# Patient Record
Sex: FEMALE | Race: BLACK OR AFRICAN AMERICAN | NOT HISPANIC OR LATINO | Employment: OTHER | URBAN - METROPOLITAN AREA
[De-identification: names, ages, dates, MRNs, and addresses within clinical notes are randomized per-mention and may not be internally consistent; named-entity substitution may affect disease eponyms.]

---

## 2018-07-18 ENCOUNTER — APPOINTMENT (OUTPATIENT)
Dept: MRI IMAGING | Facility: CLINIC | Age: 73
End: 2018-07-18
Attending: FAMILY MEDICINE
Payer: MEDICARE

## 2018-07-18 ENCOUNTER — HOSPITAL ENCOUNTER (EMERGENCY)
Facility: CLINIC | Age: 73
Discharge: HOME | End: 2018-07-18
Attending: FAMILY MEDICINE | Admitting: FAMILY MEDICINE
Payer: MEDICARE

## 2018-07-18 VITALS
SYSTOLIC BLOOD PRESSURE: 121 MMHG | WEIGHT: 219 LBS | RESPIRATION RATE: 16 BRPM | DIASTOLIC BLOOD PRESSURE: 77 MMHG | TEMPERATURE: 98.4 F | OXYGEN SATURATION: 100 % | HEART RATE: 78 BPM

## 2018-07-18 DIAGNOSIS — R42 VERTIGO: ICD-10-CM

## 2018-07-18 LAB
ALBUMIN SERPL-MCNC: 3.1 G/DL (ref 3.4–5)
ALP SERPL-CCNC: 134 U/L (ref 40–150)
ALT SERPL W P-5'-P-CCNC: 19 U/L (ref 0–50)
ANION GAP SERPL CALCULATED.3IONS-SCNC: 6 MMOL/L (ref 3–14)
AST SERPL W P-5'-P-CCNC: 30 U/L (ref 0–45)
BASOPHILS # BLD AUTO: 0 10E9/L (ref 0–0.2)
BASOPHILS NFR BLD AUTO: 0.3 %
BILIRUB SERPL-MCNC: 0.5 MG/DL (ref 0.2–1.3)
BUN SERPL-MCNC: 13 MG/DL (ref 7–30)
CALCIUM SERPL-MCNC: 8.5 MG/DL (ref 8.5–10.1)
CHLORIDE SERPL-SCNC: 109 MMOL/L (ref 94–109)
CO2 SERPL-SCNC: 27 MMOL/L (ref 20–32)
CREAT SERPL-MCNC: 1.19 MG/DL (ref 0.52–1.04)
DIFFERENTIAL METHOD BLD: ABNORMAL
EOSINOPHIL # BLD AUTO: 0.1 10E9/L (ref 0–0.7)
EOSINOPHIL NFR BLD AUTO: 3.2 %
ERYTHROCYTE [DISTWIDTH] IN BLOOD BY AUTOMATED COUNT: 14.5 % (ref 10–15)
ERYTHROCYTE [SEDIMENTATION RATE] IN BLOOD BY WESTERGREN METHOD: 39 MM/H (ref 0–30)
GFR SERPL CREATININE-BSD FRML MDRD: 44 ML/MIN/1.7M2
GLUCOSE SERPL-MCNC: 89 MG/DL (ref 70–99)
HCT VFR BLD AUTO: 32.4 % (ref 35–47)
HGB BLD-MCNC: 10.3 G/DL (ref 11.7–15.7)
IMM GRANULOCYTES # BLD: 0 10E9/L (ref 0–0.4)
IMM GRANULOCYTES NFR BLD: 0 %
INTERPRETATION ECG - MUSE: NORMAL
LYMPHOCYTES # BLD AUTO: 1.9 10E9/L (ref 0.8–5.3)
LYMPHOCYTES NFR BLD AUTO: 51.6 %
MCH RBC QN AUTO: 28.1 PG (ref 26.5–33)
MCHC RBC AUTO-ENTMCNC: 31.8 G/DL (ref 31.5–36.5)
MCV RBC AUTO: 88 FL (ref 78–100)
MONOCYTES # BLD AUTO: 0.3 10E9/L (ref 0–1.3)
MONOCYTES NFR BLD AUTO: 8.8 %
NEUTROPHILS # BLD AUTO: 1.4 10E9/L (ref 1.6–8.3)
NEUTROPHILS NFR BLD AUTO: 36.1 %
NRBC # BLD AUTO: 0 10*3/UL
NRBC BLD AUTO-RTO: 0 /100
PLATELET # BLD AUTO: 160 10E9/L (ref 150–450)
POTASSIUM SERPL-SCNC: 3.8 MMOL/L (ref 3.4–5.3)
PROT SERPL-MCNC: 7.3 G/DL (ref 6.8–8.8)
RBC # BLD AUTO: 3.67 10E12/L (ref 3.8–5.2)
SODIUM SERPL-SCNC: 142 MMOL/L (ref 133–144)
TROPONIN I SERPL-MCNC: <0.015 UG/L (ref 0–0.04)
WBC # BLD AUTO: 3.7 10E9/L (ref 4–11)

## 2018-07-18 PROCEDURE — 93010 ELECTROCARDIOGRAM REPORT: CPT | Mod: Z6 | Performed by: FAMILY MEDICINE

## 2018-07-18 PROCEDURE — 96361 HYDRATE IV INFUSION ADD-ON: CPT | Performed by: FAMILY MEDICINE

## 2018-07-18 PROCEDURE — 70544 MR ANGIOGRAPHY HEAD W/O DYE: CPT

## 2018-07-18 PROCEDURE — 25000128 H RX IP 250 OP 636: Performed by: FAMILY MEDICINE

## 2018-07-18 PROCEDURE — 25000131 ZZH RX MED GY IP 250 OP 636 PS 637: Mod: GY | Performed by: FAMILY MEDICINE

## 2018-07-18 PROCEDURE — 99284 EMERGENCY DEPT VISIT MOD MDM: CPT | Mod: 25 | Performed by: FAMILY MEDICINE

## 2018-07-18 PROCEDURE — 85652 RBC SED RATE AUTOMATED: CPT | Performed by: FAMILY MEDICINE

## 2018-07-18 PROCEDURE — 96360 HYDRATION IV INFUSION INIT: CPT | Performed by: FAMILY MEDICINE

## 2018-07-18 PROCEDURE — 70549 MR ANGIOGRAPH NECK W/O&W/DYE: CPT

## 2018-07-18 PROCEDURE — 85025 COMPLETE CBC W/AUTO DIFF WBC: CPT | Performed by: FAMILY MEDICINE

## 2018-07-18 PROCEDURE — A9585 GADOBUTROL INJECTION: HCPCS | Performed by: FAMILY MEDICINE

## 2018-07-18 PROCEDURE — A9270 NON-COVERED ITEM OR SERVICE: HCPCS | Mod: GY | Performed by: FAMILY MEDICINE

## 2018-07-18 PROCEDURE — 70553 MRI BRAIN STEM W/O & W/DYE: CPT

## 2018-07-18 PROCEDURE — 84484 ASSAY OF TROPONIN QUANT: CPT | Performed by: FAMILY MEDICINE

## 2018-07-18 PROCEDURE — 80053 COMPREHEN METABOLIC PANEL: CPT | Performed by: FAMILY MEDICINE

## 2018-07-18 PROCEDURE — 99284 EMERGENCY DEPT VISIT MOD MDM: CPT | Mod: Z6 | Performed by: FAMILY MEDICINE

## 2018-07-18 PROCEDURE — 93005 ELECTROCARDIOGRAM TRACING: CPT | Performed by: FAMILY MEDICINE

## 2018-07-18 RX ORDER — GADOBUTROL 604.72 MG/ML
10 INJECTION INTRAVENOUS ONCE
Status: COMPLETED | OUTPATIENT
Start: 2018-07-18 | End: 2018-07-18

## 2018-07-18 RX ORDER — MECLIZINE HCL 12.5 MG 12.5 MG/1
25 TABLET ORAL 4 TIMES DAILY PRN
Qty: 30 TABLET | Refills: 0 | Status: SHIPPED | OUTPATIENT
Start: 2018-07-18 | End: 2021-02-22

## 2018-07-18 RX ORDER — MECLIZINE HYDROCHLORIDE 25 MG/1
25 TABLET ORAL ONCE
Status: COMPLETED | OUTPATIENT
Start: 2018-07-18 | End: 2018-07-18

## 2018-07-18 RX ADMIN — SODIUM CHLORIDE 1000 ML: 9 INJECTION, SOLUTION INTRAVENOUS at 08:03

## 2018-07-18 RX ADMIN — SODIUM CHLORIDE 50 ML: 9 INJECTION, SOLUTION INTRAVENOUS at 10:10

## 2018-07-18 RX ADMIN — GADOBUTROL 10 ML: 604.72 INJECTION INTRAVENOUS at 10:11

## 2018-07-18 RX ADMIN — MECLIZINE 25 MG: 25 TABLET ORAL at 07:43

## 2018-07-18 NOTE — ED AVS SNAPSHOT
[facility], [place], Emergency Department    [address]    [place] [address]    Phone:  [phone]    Fax:  [phone]                                       Sonja Perez   MRN: [id number]    Department:  [facility], Emergency Department   Date of Visit:  7/18/2018           After Visit Summary Signature Page     I have received my discharge instructions, and my questions have been answered. I have discussed any challenges I see with this plan with the nurse or doctor.    ..........................................................................................................................................  Patient/Patient Representative Signature      ..........................................................................................................................................  Patient Representative Print Name and Relationship to Patient    ..................................................               ................................................  Date                                            Time    ..........................................................................................................................................  Reviewed by Signature/Title    ...................................................              ..............................................  Date                                                            Time

## 2018-07-18 NOTE — ED AVS SNAPSHOT
[facility], Emergency Department    [address]    [place] [address]    Phone:  [phone]    Fax:  [phone]                                       Sonja Perez   MRN: [id number]    Department:  [facility], Emergency Department   Date of Visit:  7/18/2018           Patient Information     Date Of Birth          1945        Your diagnoses for this visit were:     Vertigo        You were seen by Neeraj Mendez MD.        Discharge Instructions       Thank you for choosing [facility].     Please closely monitor for further symptoms. Return to the Emergency Department if you develop any new or worsening signs or symptoms.    If you received any opiate pain medications or sedatives during your visit, please do not drive for at least 8 hours.     Labs, cultures or final xray interpretations may still need to be reviewed.  We will call you if your plan of care needs to be changed.    Please follow up with your primary care physician or clinic in the next 5-7 days for recheck.      Discharge References/Attachments     BENIGN PAROXYSMAL POSITIONAL VERTIGO (ENGLISH)      24 Hour Appointment Hotline       To make an appointment at any [facility] clinic, call [phone]). If you don't have a family doctor or clinic, we will help you find one. [facility] clinics are conveniently located to serve the needs of you and your family.             Review of your medicines      START taking        Dose / Directions Last dose taken    meclizine 12.5 MG tablet   Commonly known as:  ANTIVERT   Dose:  25 mg   Quantity:  30 tablet        Take 2 tablets (25 mg) by mouth 4 times daily as needed for dizziness   Refills:  0          Our records show that you are taking the medicines listed below. If these are incorrect, please call your family doctor or clinic.        Dose / Directions Last dose taken    TYLENOL PO        Refills:  0                Prescriptions were sent  "or printed at these locations (1 Prescription)                   Other Prescriptions                Printed at Department/Unit printer (1 of 1)         meclizine (ANTIVERT) 12.5 MG tablet                Procedures and tests performed during your visit     CBC with platelets differential    Comprehensive metabolic panel    EKG 12 lead    Erythrocyte sedimentation rate auto    MR Brain w/o & w Contrast    MRA Brain ([place]) wo Contrast    MRA Neck (Carotids) wo & w Contrast    Troponin I      Orders Needing Specimen Collection     None      Pending Results     Date and Time Order Name Status Description    7/18/2018 0712 MR Brain w/o & w Contrast Preliminary     7/18/2018 0712 MRA Neck (Carotids) wo & w Contrast Preliminary             Pending Culture Results     No orders found from 7/16/2018 to 7/19/2018.            Pending Results Instructions     If you had any lab results that were not finalized at the time of your Discharge, you can call the ED Lab Result RN at [phone]. You will be contacted by this team for any positive Lab results or changes in treatment. The nurses are available 7 days a week from 10A to 6:30P.  You can leave a message 24 hours per day and they will return your call.        Thank you for choosing [facility]       Thank you for choosing [facility] for your care. Our goal is always to provide you with excellent care. Hearing back from our patients is one way we can continue to improve our services. Please take a few minutes to complete the written survey that you may receive in the mail after you visit with us. Thank you!        [employer] Information     [employer] lets you send messages to your doctor, view your test results, renew your prescriptions, schedule appointments and more. To sign up, go to [email or web address] . Click on \"Log in\" on the left side of the screen, which will take you to the Welcome page. Then click on \"Sign up Now\" on the right side of the page.     You will " be asked to enter the access code listed below, as well as some personal information. Please follow the directions to create your username and password.     Your access code is: [email or web address]  Expires: 10/16/2018 11:07 AM     Your access code will  in 90 days. If you need help or a new code, please call your [facility] clinic or [phone].        Care EveryWhere ID     This is your Care EveryWhere ID. This could be used by other organizations to access your [facility] medical records  [id number]        Equal Access to Services     [facility]: Hadyonas Roa, ronak cabral, sylvia waddellalswati hickey, carrington jones . So [facility] [phone].    ATENCIÓN: Si habla español, tiene a bermudez disposición servicios gratuitos de asistencia lingüística. Neil al [phone].    We comply with applicable federal civil rights laws and [place] laws. We do not discriminate on the basis of race, color, national origin, age, disability, sex, sexual orientation, or gender identity.            After Visit Summary       This is your record. Keep this with you and show to your community pharmacist(s) and doctor(s) at your next visit.

## 2018-07-18 NOTE — ED PROVIDER NOTES
History     Chief Complaint   Patient presents with     Dizziness     dizziness x 3 days     Headache     x 3 days; denies trauma, N/V, and photophobia     HPI  Sonja Perez is a 72 year old female who sense with vertigo, headache, difficulty concentrating.  Patient states she has a history of episodic vertigo which dates back several years.  Usually she can manage this by laying down in a quiet place.  3 days ago she developed vertigo characterized as the room spinning.  Shortly thereafter she had the gradual onset of a frontal headache which has grown progressively worse over 3 days.  The headache is associated with some photophobia and phonophobia.  He feels as though the vertigo is worse as the headache was worse.  She also states that she is having difficulty concentrating, forgets what she is doing, has become disoriented.  Denies any fever, visual change, nausea, vomiting.  No numbness weakness tingling or difficulty with speech or coordination.  No difficulty swallowing.  No ear pain, tinnitus, or hearing loss.  No fainting or lightheadedness.  No chest pain or palpitations.  No skin rash, no recent travel.    I have reviewed the Medications, Allergies, Past Medical and Surgical History, and Social History in the Epic system.    Review of Systems  All other systems were reviewed and are negative    Physical Exam   BP: 139/78  Pulse: 78  Heart Rate: 86  Temp: 98.4  F (36.9  C)  Resp: 20  Weight: 99.3 kg (219 lb)  SpO2: 98 %      Physical Exam   Constitutional: She is oriented to person, place, and time. She appears well-developed and well-nourished.   HENT:   Head: Normocephalic and atraumatic.   Mouth/Throat: Oropharynx is clear and moist.   Eyes: Pupils are equal, round, and reactive to light. Right eye exhibits nystagmus (2-3 beats of horizontal nystagmus which appears to be extinguishable). Left eye exhibits nystagmus.   Funduscopic exam unremarkable, temporal arteries nontender   Neck: Normal range  of motion. Neck supple. No tracheal deviation present. No thyromegaly present.   Cardiovascular: Normal rate, regular rhythm and intact distal pulses.  Exam reveals no gallop and no friction rub.    Murmur heard.   Systolic murmur is present with a grade of 1/6   A soft systolic murmur appreciated the left lower sternal border   Pulmonary/Chest: Effort normal and breath sounds normal. She exhibits no tenderness.   Abdominal: Soft. Bowel sounds are normal. She exhibits no distension and no mass. There is no tenderness.   Musculoskeletal: She exhibits no edema or tenderness.   Neurological: She is alert and oriented to person, place, and time. She has normal strength. No cranial nerve deficit or sensory deficit. Coordination and gait normal.   Finger-nose-finger normal   Skin: Skin is warm and dry. No rash noted.   Psychiatric: She has a normal mood and affect. Her behavior is normal.   Nursing note and vitals reviewed.      ED Course     ED Course     Procedures             EKG Interpretation:      Interpreted by Neeraj Mendez  Time reviewed: 0724  Symptoms at time of EKG: Vertigo  Rhythm: normal sinus   Rate: Normal  Axis: Left Axis Deviation  Ectopy: none  Conduction: normal  ST Segments/ T Waves: No ST-T wave changes and No acute ischemic changes  Q Waves: none  Comparison to prior: No old EKG available    Clinical Impression: Normal sinus rhythm with left axis deviation.  Otherwise normal EKG                Critical Care time:  none             Labs Ordered and Resulted from Time of ED Arrival Up to the Time of Departure from the ED   CBC WITH PLATELETS DIFFERENTIAL - Abnormal; Notable for the following:        Result Value    WBC 3.7 (*)     RBC Count 3.67 (*)     Hemoglobin 10.3 (*)     Hematocrit 32.4 (*)     Absolute Neutrophil 1.4 (*)     All other components within normal limits   COMPREHENSIVE METABOLIC PANEL - Abnormal; Notable for the following:     Creatinine 1.19 (*)     GFR Estimate 44 (*)     GFR  Estimate If Black 54 (*)     Albumin 3.1 (*)     All other components within normal limits   ERYTHROCYTE SEDIMENTATION RATE AUTO - Abnormal; Notable for the following:     Sed Rate 39 (*)     All other components within normal limits   TROPONIN I            Assessments & Plan (with Medical Decision Making)   Elderly patient presenting with 3 day history of headache, vertigo, and difficulty concentrating.  Differential diagnosis includes but not limited to:  Central causes such as vertebro-basilar artery insufficiency, cerebellar hemorrhage or infarct, infection such as meningitis, CNS neoplasm, MS.  Also peripheral causes considered including benign positional vertigo, labyrinthitis, vestibular neuronitis, otitis media, Ménière's disease, traumatic injury to the labyrinthine apparatus.  On exam her vital signs are normal, mental status normal, funduscopic exam unremarkable with nontender temporal arteries, neck supple, neurologic exam normal.  Due to the severity of her symptoms, the associated headache and mental status concerns and the fact that she is 72 years old a workup was entertained.  Her EKG is unremarkable.  Labs reveal some anemia and leukopenia.  Per the patient she has mild chronic anemia.  There is no history of any bleeding or blood loss.  Her other labs only mild chronic kidney disease, sed rate normal for age.  Imaging the brain with MRA was obtained and this is normal.  Her vertigo appears to be related to peripheral source, likely benign positional vertigo or vestibular neuronitis.  Improved with IV fluids and oral Antivert.  Vertigo has almost completely resolved and she has no mental status abnormalities and a normal neurologic exam.  We discussed the importance of follow-up with her primary physician and if her symptoms become more bothersome vestibular rehab.  Based on the clinical findings and the entire clinical scenario, the patient appears stable at this time to be treated  symptomatically, and to follow-up as an outpatient for any further evaluation and treatment.  Discussed expected course, need for follow up, and indications for return with the patient.  See discharge instructions.    I have reviewed the nursing notes.    I have reviewed the findings, diagnosis, plan and need for follow up with the patient.    New Prescriptions    MECLIZINE (ANTIVERT) 12.5 MG TABLET    Take 2 tablets (25 mg) by mouth 4 times daily as needed for dizziness       Final diagnoses:   Vertigo       7/18/2018   [facility], [place], EMERGENCY DEPARTMENT     Neeraj Mendez MD  07/18/18 4168

## 2018-07-18 NOTE — DISCHARGE INSTRUCTIONS
Thank you for choosing [facility].     Please closely monitor for further symptoms. Return to the Emergency Department if you develop any new or worsening signs or symptoms.    If you received any opiate pain medications or sedatives during your visit, please do not drive for at least 8 hours.     Labs, cultures or final xray interpretations may still need to be reviewed.  We will call you if your plan of care needs to be changed.    Please follow up with your primary care physician or clinic in the next 5-7 days for recheck.

## 2018-12-05 ENCOUNTER — OFFICE VISIT (OUTPATIENT)
Dept: FAMILY MEDICINE | Facility: CLINIC | Age: 73
End: 2018-12-05
Payer: MEDICARE

## 2018-12-05 VITALS
HEART RATE: 70 BPM | OXYGEN SATURATION: 98 % | TEMPERATURE: 97.1 F | SYSTOLIC BLOOD PRESSURE: 139 MMHG | DIASTOLIC BLOOD PRESSURE: 80 MMHG | WEIGHT: 206 LBS

## 2018-12-05 DIAGNOSIS — J30.89 SEASONAL ALLERGIC RHINITIS DUE TO OTHER ALLERGIC TRIGGER: ICD-10-CM

## 2018-12-05 DIAGNOSIS — J32.9 RECURRENT SINUSITIS: Primary | ICD-10-CM

## 2018-12-05 DIAGNOSIS — Z23 NEED FOR PROPHYLACTIC VACCINATION AND INOCULATION AGAINST INFLUENZA: ICD-10-CM

## 2018-12-05 PROCEDURE — G0008 ADMIN INFLUENZA VIRUS VAC: HCPCS | Performed by: FAMILY MEDICINE

## 2018-12-05 PROCEDURE — 90662 IIV NO PRSV INCREASED AG IM: CPT | Performed by: FAMILY MEDICINE

## 2018-12-05 PROCEDURE — 99213 OFFICE O/P EST LOW 20 MIN: CPT | Mod: 25 | Performed by: FAMILY MEDICINE

## 2018-12-05 RX ORDER — CARBOXYMETHYLCELLULOSE SODIUM, GLYCERIN, AND POLYSORBATE 80 5; 10; 5 MG/ML; MG/ML; MG/ML
SOLUTION/ DROPS OPHTHALMIC
Refills: 8 | COMMUNITY
Start: 2018-10-11

## 2018-12-05 RX ORDER — DOXYCYCLINE 100 MG/1
100 CAPSULE ORAL 2 TIMES DAILY
Qty: 20 CAPSULE | Refills: 0 | Status: SHIPPED | OUTPATIENT
Start: 2018-12-05 | End: 2018-12-15

## 2018-12-05 RX ORDER — BECLOMETHASONE DIPROPIONATE HFA 80 UG/1
AEROSOL, METERED RESPIRATORY (INHALATION)
Refills: 11 | COMMUNITY
Start: 2018-10-22

## 2018-12-05 RX ORDER — MONTELUKAST SODIUM 10 MG/1
10 TABLET ORAL AT BEDTIME
Qty: 30 TABLET | Refills: 6 | Status: SHIPPED | OUTPATIENT
Start: 2018-12-05

## 2018-12-05 RX ORDER — ALBUTEROL SULFATE 90 UG/1
2 AEROSOL, METERED RESPIRATORY (INHALATION) EVERY 4 HOURS PRN
Refills: 2 | COMMUNITY
Start: 2017-12-30

## 2018-12-05 RX ORDER — CEFUROXIME AXETIL 500 MG/1
TABLET ORAL
Refills: 0 | COMMUNITY
Start: 2018-10-22 | End: 2018-12-05 | Stop reason: ALTCHOICE

## 2018-12-05 RX ORDER — LEVOFLOXACIN 250 MG/1
TABLET, FILM COATED ORAL
Refills: 0 | COMMUNITY
Start: 2018-10-11 | End: 2018-12-05 | Stop reason: ALTCHOICE

## 2018-12-05 RX ORDER — BENZONATATE 100 MG/1
CAPSULE ORAL
Refills: 0 | COMMUNITY
Start: 2018-10-11 | End: 2018-12-05

## 2018-12-05 RX ORDER — DEXAMETHASONE 4 MG/1
TABLET ORAL
Refills: 11 | COMMUNITY
Start: 2018-11-06

## 2018-12-05 ASSESSMENT — PAIN SCALES - GENERAL: PAINLEVEL: NO PAIN (0)

## 2018-12-05 NOTE — MR AVS SNAPSHOT
After Visit Summary   12/5/2018    Sonja Perez    MRN: [id number]           Patient Information     Date Of Birth          1945        Visit Information        Provider Department      12/5/2018 10:40 AM Stef Robbins MD; LANGUAGE [facility]        Today's Diagnoses     Recurrent sinusitis    -  1    Need for prophylactic vaccination and inoculation against influenza        Seasonal allergic rhinitis due to other allergic trigger           Follow-ups after your visit        Additional Services     OTOLARYNGOLOGY REFERRAL       Your provider has referred you to: [facility]: [facility] [phone]   [email or web address]    Please be aware that coverage of these services is subject to the terms and limitations of your health insurance plan.  Call member services at your health plan with any benefit or coverage questions.      Please bring the following with you to your appointment:    (1) Any X-Rays, CTs or MRIs which have been performed.  Contact the facility where they were done to arrange for  prior to your scheduled appointment.   (2) List of current medications  (3) This referral request   (4) Any documents/labs given to you for this referral                  Who to contact     If you have questions or need follow up information about today's clinic visit or your schedule please contact [facility] directly at [phone].  Normal or non-critical lab and imaging results will be communicated to you by MyChart, letter or phone within 4 business days after the clinic has received the results. If you do not hear from us within 7 days, please contact the clinic through MyChart or phone. If you have a critical or abnormal lab result, we will notify you by phone as soon as possible.  Submit refill requests through [employer] or call your pharmacy and they will forward the refill request to us. Please  allow 3 business days for your refill to be completed.          Additional Information About Your Visit        Care EveryWhere ID     This is your Care EveryWhere ID. This could be used by other organizations to access your [facility] medical records  [id number]        Your Vitals Were     Pulse Temperature Pulse Oximetry Breastfeeding?          70 97.1  F (36.2  C) (Oral) 98% No         Blood Pressure from Last 3 Encounters:   12/05/18 139/80   07/18/18 121/77    Weight from Last 3 Encounters:   12/05/18 206 lb (93.4 kg)   07/18/18 219 lb (99.3 kg)              We Performed the Following     FLU VACCINE, INCREASED ANTIGEN, PRESV FREE, AGE 65+ [49324]     OTOLARYNGOLOGY REFERRAL     Vaccine Administration, Initial [66020]          Today's Medication Changes          These changes are accurate as of 12/5/18 11:37 AM.  If you have any questions, ask your nurse or doctor.               Start taking these medicines.        Dose/Directions    doxycycline monohydrate 100 MG capsule   Commonly known as:  MONODOX   Started by:  Stef Robbins MD        Dose:  100 mg   Take 1 capsule (100 mg) by mouth 2 times daily for 10 days   Quantity:  20 capsule   Refills:  0       montelukast 10 MG tablet   Commonly known as:  SINGULAIR   Used for:  Seasonal allergic rhinitis due to other allergic trigger   Started by:  Stef Robbins MD        Dose:  10 mg   Take 1 tablet (10 mg) by mouth At Bedtime   Quantity:  30 tablet   Refills:  6         Stop taking these medicines if you haven't already. Please contact your care team if you have questions.     cefuroxime 500 MG tablet   Commonly known as:  CEFTIN   Stopped by:  Stef Robbins MD           levofloxacin 250 MG tablet   Commonly known as:  LEVAQUIN   Stopped by:  Stef Robbins MD                Where to get your medicines      These medications were sent to [facility]/pharmacy #[id number] - [place], [place] - [address]  [address] [facility] [address]      Phone:  [phone]     doxycycline monohydrate 100 MG capsule    montelukast 10 MG tablet                Primary Care Provider    Sonia Perez MD       No address on file        Equal Access to Services     PAVEL JOHNSONLYDIA : Hadii aad ku haddianeparrish Roa, waterryda luivettevanessa, eldonta kamarina hickey, carrington reynolds macrinayoko mera karen abdi. So [facility] [phone].    ATENCIÓN: Si habla español, tiene a bermudez disposición servicios gratuitos de asistencia lingüística. Llame al [phone].    We comply with applicable federal civil rights laws and [place] laws. We do not discriminate on the basis of race, color, national origin, age, disability, sex, sexual orientation, or gender identity.            Thank you!     Thank you for choosing [facility]  for your care. Our goal is always to provide you with excellent care. Hearing back from our patients is one way we can continue to improve our services. Please take a few minutes to complete the written survey that you may receive in the mail after your visit with us. Thank you!             Your Updated Medication List - Protect others around you: Learn how to safely use, store and throw away your medicines at www.disposemymeds.org.          This list is accurate as of 12/5/18 11:37 AM.  Always use your most recent med list.                   Brand Name Dispense Instructions for use Diagnosis    doxycycline monohydrate 100 MG capsule    MONODOX    20 capsule    Take 1 capsule (100 mg) by mouth 2 times daily for 10 days        FLOVENT  MCG/ACT inhaler   Generic drug:  fluticasone      TAKE 2 PUFFS BY MOUTH TWICE A DAY        meclizine 12.5 MG tablet    ANTIVERT    30 tablet    Take 2 tablets (25 mg) by mouth 4 times daily as needed for dizziness        montelukast 10 MG tablet    SINGULAIR    30 tablet    Take 1 tablet (10 mg) by mouth At Bedtime    Seasonal allergic rhinitis due to other allergic trigger       QVAR REDIHALER 80 MCG/ACT  inhaler   Generic drug:  beclomethasone HFA      INHALE 1 PUFF BY MOUTH TWICE A DAY INTO EACH NOSTRIL        REFRESH OPTIVE ADVANCED 0.5-1-0.5 % Soln   Generic drug:  Carboxymeth-Glycerin-Polysorb      1 DROP IN BOTH EYES 3 TIMES DAILY        TYLENOL PO           VENTOLIN  (90 Base) MCG/ACT inhaler   Generic drug:  albuterol      Inhale 2 puffs into the lungs every 4 hours as needed

## 2018-12-05 NOTE — PROGRESS NOTES
SUBJECTIVE:                                                    Sonja Perez is a 73 year old female who presents to clinic today for the following health issues:  Patient comes with daughter and an  for symptom of cough, sneezing, runny nose.  Sore throat at sometimes.  Patient reports she was diagnosed with allergy, and asthma in the past.  She reports she is been given Qvar twice daily.  She is not taking any allergy medication, at this time, in the past tried nasal spray.  She reports she also been diagnosed with recurrent sinus infection in the past.  She reports she was seen an allergist in the past.    Denies any fever, chills, has no short of breath, denies wheezing.  Denies headache.  Has no diarrhea no vomiting.    ENT Symptoms             Symptoms: cc Present Absent Comment   Fever/Chills   x    Fatigue   x    Muscle Aches   x    Eye Irritation  x     Sneezing  x     Nasal Bassem/Drg  x     Sinus Pressure/Pain  x     Loss of smell   x    Dental pain   x    Sore Throat   x    Swollen Glands   x    Ear Pain/Fullness   x    Cough  x     Wheeze   x    Chest Pain   x    Shortness of breath   x    Rash   x    Other   x      Symptom duration:  October   Symptom severity:  Moderate   Treatments tried:  Prescribed medication   Contacts:  Unsure        Patient complained of headaches due to sneezing       Problem list and histories reviewed & adjusted, as indicated.  Additional history: as documented    Patient Active Problem List   Diagnosis     Recurrent sinusitis     Seasonal allergic rhinitis due to other allergic trigger     History of Helicobacter pylori infection     History reviewed. No pertinent surgical history.    Social History   Substance Use Topics     Smoking status: Never Smoker     Smokeless tobacco: Never Used     Alcohol use No     History reviewed. No pertinent family history.      Current Outpatient Prescriptions   Medication Sig Dispense Refill     Acetaminophen (TYLENOL PO)         doxycycline monohydrate (MONODOX) 100 MG capsule Take 1 capsule (100 mg) by mouth 2 times daily for 10 days 20 capsule 0     FLOVENT  MCG/ACT inhaler TAKE 2 PUFFS BY MOUTH TWICE A DAY  11     montelukast (SINGULAIR) 10 MG tablet Take 1 tablet (10 mg) by mouth At Bedtime 30 tablet 6     QVAR REDIHALER 80 MCG/ACT inhaler INHALE 1 PUFF BY MOUTH TWICE A DAY INTO EACH NOSTRIL  11     REFRESH OPTIVE ADVANCED 0.5-1-0.5 % SOLN 1 DROP IN BOTH EYES 3 TIMES DAILY  8     VENTOLIN  (90 Base) MCG/ACT inhaler Inhale 2 puffs into the lungs every 4 hours as needed  2     meclizine (ANTIVERT) 12.5 MG tablet Take 2 tablets (25 mg) by mouth 4 times daily as needed for dizziness (Patient not taking: Reported on 12/5/2018) 30 tablet 0     Allergies   Allergen Reactions     Oxybutynin Itching     Other reaction(s): Edema       ROS:  Constitutional, HEENT, cardiovascular, pulmonary, gi and gu systems are negative, except as otherwise noted.    OBJECTIVE:     /80 (BP Location: Right arm, Patient Position: Chair, Cuff Size: Adult Large)  Pulse 70  Temp 97.1  F (36.2  C) (Oral)  Wt 206 lb (93.4 kg)  SpO2 98%  Breastfeeding? No  There is no height or weight on file to calculate BMI.  GENERAL: healthy, alert and no distress  NECK: no adenopathy, no asymmetry, masses, or scars and thyroid normal to palpation  RESP: lungs clear to auscultation - no rales, rhonchi or wheezes  CV: regular rate and rhythm, normal S1 S2, no S3 or S4, no murmur, click or rub, no peripheral edema and peripheral pulses strong  MS: no gross musculoskeletal defects noted, no edema    Diagnostic Test Results:  none     ASSESSMENT/PLAN:     Given immunizations: Influenza today      ICD-10-CM    1. Recurrent sinusitis J32.9 doxycycline monohydrate (MONODOX) 100 MG capsule     OTOLARYNGOLOGY REFERRAL   2. Need for prophylactic vaccination and inoculation against influenza Z23 FLU VACCINE, INCREASED ANTIGEN, PRESV FREE, AGE 65+ [11068]     Vaccine  Administration, Initial [92243]   3. Seasonal allergic rhinitis due to other allergic trigger J30.89 FLOVENT  MCG/ACT inhaler     montelukast (SINGULAIR) 10 MG tablet   Given Doxy and Singulair  Continue with Qvair for his cough and history of asthma.  Referred to ENT    Stef Robbins MD  [facility]        Injectable Influenza Immunization Documentation    1.  Is the person to be vaccinated sick today?   No    2. Does the person to be vaccinated have an allergy to a component   of the vaccine?   No  Egg Allergy Algorithm Link    3. Has the person to be vaccinated ever had a serious reaction   to influenza vaccine in the past?   No    4. Has the person to be vaccinated ever had Guillain-Barré syndrome?   No    Form completed by Augustina Menjivar CMA on 12/5/2018 at 11:35 AM

## 2018-12-17 ENCOUNTER — OFFICE VISIT (OUTPATIENT)
Dept: OTOLARYNGOLOGY | Facility: CLINIC | Age: 73
End: 2018-12-17
Payer: MEDICARE

## 2018-12-17 VITALS
OXYGEN SATURATION: 100 % | DIASTOLIC BLOOD PRESSURE: 83 MMHG | SYSTOLIC BLOOD PRESSURE: 147 MMHG | WEIGHT: 206 LBS | HEART RATE: 73 BPM

## 2018-12-17 DIAGNOSIS — J34.89 NASAL OBSTRUCTION: ICD-10-CM

## 2018-12-17 DIAGNOSIS — J31.0 CHRONIC RHINITIS: Primary | ICD-10-CM

## 2018-12-17 DIAGNOSIS — R05.3 CHRONIC COUGH: ICD-10-CM

## 2018-12-17 PROCEDURE — 99203 OFFICE O/P NEW LOW 30 MIN: CPT | Performed by: OTOLARYNGOLOGY

## 2018-12-17 NOTE — PROGRESS NOTES
"History of Present Illness - Sonja Perez is a 73 year old female being seen in consultation from Dr. Stef Robbins, for recurrent sinus issues.    She presents with a problem that has been there 8 years, since she moved to [place]. She tells me that this started with a cough and sneezing that has basically not resolved.  She has continued to have the feeling of post nasal drainage and \"choking\" on her mucous.  She will also get sneezing and dry cough, as well as irritation of the eyes. This happens all year round, no seasonality.  Interestingly, it can be variable even from day to day.    She has been tried on tried on antihistamines, allergy sprays, and nothing seems to have worked.  She has tried Qvar, singulair and flonase.    Past Medical History -   Patient Active Problem List   Diagnosis     Recurrent sinusitis     Seasonal allergic rhinitis due to other allergic trigger     History of Helicobacter pylori infection       Current Medications -   Current Outpatient Medications:      Acetaminophen (TYLENOL PO), , Disp: , Rfl:      FLOVENT  MCG/ACT inhaler, TAKE 2 PUFFS BY MOUTH TWICE A DAY, Disp: , Rfl: 11     meclizine (ANTIVERT) 12.5 MG tablet, Take 2 tablets (25 mg) by mouth 4 times daily as needed for dizziness (Patient not taking: Reported on 12/5/2018), Disp: 30 tablet, Rfl: 0     montelukast (SINGULAIR) 10 MG tablet, Take 1 tablet (10 mg) by mouth At Bedtime, Disp: 30 tablet, Rfl: 6     QVAR REDIHALER 80 MCG/ACT inhaler, INHALE 1 PUFF BY MOUTH TWICE A DAY INTO EACH NOSTRIL, Disp: , Rfl: 11     REFRESH OPTIVE ADVANCED 0.5-1-0.5 % SOLN, 1 DROP IN BOTH EYES 3 TIMES DAILY, Disp: , Rfl: 8     VENTOLIN  (90 Base) MCG/ACT inhaler, Inhale 2 puffs into the lungs every 4 hours as needed, Disp: , Rfl: 2    Allergies -   Allergies   Allergen Reactions     Oxybutynin Itching     Other reaction(s): Edema       Social History -   Social History     Socioeconomic History     Marital status: Single     " Spouse name: Not on file     Number of children: Not on file     Years of education: Not on file     Highest education level: Not on file   Social Needs     Financial resource strain: Not on file     Food insecurity - worry: Not on file     Food insecurity - inability: Not on file     Transportation needs - medical: Not on file     Transportation needs - non-medical: Not on file   Occupational History     Not on file   Tobacco Use     Smoking status: Never Smoker     Smokeless tobacco: Never Used   Substance and Sexual Activity     Alcohol use: No     Drug use: No     Sexual activity: Not on file   Other Topics Concern     Not on file   Social History Narrative     Not on file       Family History - No family history on file.    Review of Systems - As per HPI and PMHx, otherwise 10+ system review of the head and neck, and general constitution is negative.    Physical Exam  /83   Pulse 73   Wt 93.4 kg (206 lb)   SpO2 100%     General - The patient is well nourished and well developed, and appears to have good nutritional status.  Alert and oriented to person and place, answers questions and cooperates with examination appropriately.   Head and Face - Normocephalic and atraumatic, with no gross asymmetry noted of the contour of the facial features.  The facial nerve is intact, with strong symmetric movements.  Voice and Breathing - The patient was breathing comfortably without the use of accessory muscles. There was no wheezing, stridor, or stertor.  The patients voice was clear and strong, and had appropriate pitch and quality.  Ears - The tympanic membranes are normal in appearance, bony landmarks are intact.  No retraction, perforation, or masses.  No fluid or purulence was seen in the external canal or the middle ear. No evidence of infection of the middle ear or external canal, cerumen was normal in appearance.  Eyes - Extraocular movements intact, and the pupils were reactive to light.  Sclera were not  icteric or injected, conjunctiva were pink and moist.  Mouth - Examination of the oral cavity showed pink, healthy oral mucosa. No lesions or ulcerations noted.  The tongue was mobile and midline, and the dentition were in good condition.    Throat - The walls of the oropharynx were smooth, pink, moist, symmetric, and had no lesions or ulcerations.  The tonsillar pillars and soft palate were symmetric.  The uvula was midline on elevation.    Neck - Normal midline excursion of the laryngotracheal complex during swallowing.  Full range of motion on passive movement.  There is a firm 2x3cm lump in the LEFT upper neck, adjacent the tail of parotid.  No pain, nontender. Otherwise, palpation of the occipital, submental, submandibular, internal jugular chain, and supraclavicular nodes did not demonstrate any abnormal lymph nodes or masses.  The carotid pulse was palpable bilaterally.  Palpation of the thyroid was soft and smooth, with no nodules or goiter appreciated.  The trachea was mobile and midline.  Nose - External contour is symmetric, no gross deflection or scars.  Nasal mucosa is pink and moist with no abnormal mucus.  The septum was midline and non-obstructive, turbinates of normal size and position.  No polyps, masses, or purulence noted on examination.      A/P - Sonja Perez is a 73 year old female  (J31.0) Chronic rhinitis  (primary encounter diagnosis)  (J34.89) Nasal obstruction  (R05) Chronic cough    The exam is so benign, and given the vascillating symptoms from day to day, I do not suspect chronic infection.  I think this is allergy, and will refer to our allergy team for further work up.  If that fails, then I would recommend a sinus CT to definitively tell if there is an anatomic issue or signs of sinusitis.

## 2018-12-17 NOTE — PATIENT INSTRUCTIONS
Scheduling Information  To schedule your CT/MRI scan, please contact [facility] at [phone] OR [facility] at [phone]    To schedule your Surgery, please contact our Specialty Schedulers at [phone]      ENT Clinic Locations Clinic Hours Telephone Number     [facility]  [address] [place] [address]   Monday:           1:00pm -- 5:00pm    Friday:              8:00am - 12:00pm   To schedule/reschedule an appointment with   Dr. Puente,   please contact our   Specialty Scheduling Department at:     [phone]       [facility]  [address] [place] [address] Tuesday:          8:00am -- 2:00pm         Urgent Care Locations Clinic Hours Telephone Numbers     [facility]  [address] [place] [address]     Monday-Friday:     11:00am - 9:00pm    Saturday-Sunday:  9:00am - 5:00pm   [phone]     [facility]  [address] [place] [address]     Monday-Friday:      5:00pm - 9:00pm     Saturday-Sunday:  9:00am - 5:00pm   [phone]

## 2019-12-10 ENCOUNTER — APPOINTMENT (OUTPATIENT)
Dept: GENERAL RADIOLOGY | Facility: CLINIC | Age: 74
End: 2019-12-10
Attending: EMERGENCY MEDICINE
Payer: COMMERCIAL

## 2019-12-10 ENCOUNTER — HOSPITAL ENCOUNTER (EMERGENCY)
Facility: CLINIC | Age: 74
Discharge: HOME | End: 2019-12-10
Attending: EMERGENCY MEDICINE | Admitting: EMERGENCY MEDICINE
Payer: COMMERCIAL

## 2019-12-10 VITALS
TEMPERATURE: 97.8 F | RESPIRATION RATE: 16 BRPM | DIASTOLIC BLOOD PRESSURE: 70 MMHG | SYSTOLIC BLOOD PRESSURE: 133 MMHG | WEIGHT: 210 LBS | HEART RATE: 78 BPM | OXYGEN SATURATION: 100 %

## 2019-12-10 DIAGNOSIS — M79.10 MYALGIA: ICD-10-CM

## 2019-12-10 DIAGNOSIS — B34.9 VIRAL INFECTION: ICD-10-CM

## 2019-12-10 LAB
ALBUMIN SERPL-MCNC: 2.9 G/DL (ref 3.4–5)
ALBUMIN UR-MCNC: NEGATIVE MG/DL
ALP SERPL-CCNC: 121 U/L (ref 40–150)
ALT SERPL W P-5'-P-CCNC: 25 U/L (ref 0–50)
ANION GAP SERPL CALCULATED.3IONS-SCNC: 4 MMOL/L (ref 3–14)
APPEARANCE UR: CLEAR
AST SERPL W P-5'-P-CCNC: 35 U/L (ref 0–45)
BACTERIA #/AREA URNS HPF: ABNORMAL /HPF
BASOPHILS # BLD AUTO: 0 10E9/L (ref 0–0.2)
BASOPHILS NFR BLD AUTO: 0.4 %
BILIRUB SERPL-MCNC: 0.7 MG/DL (ref 0.2–1.3)
BILIRUB UR QL STRIP: NEGATIVE
BUN SERPL-MCNC: 11 MG/DL (ref 7–30)
CALCIUM SERPL-MCNC: 8.5 MG/DL (ref 8.5–10.1)
CHLORIDE SERPL-SCNC: 107 MMOL/L (ref 94–109)
CO2 SERPL-SCNC: 27 MMOL/L (ref 20–32)
COLOR UR AUTO: ABNORMAL
CREAT SERPL-MCNC: 0.97 MG/DL (ref 0.52–1.04)
DIFFERENTIAL METHOD BLD: NORMAL
EOSINOPHIL # BLD AUTO: 0.2 10E9/L (ref 0–0.7)
EOSINOPHIL NFR BLD AUTO: 3.9 %
ERYTHROCYTE [DISTWIDTH] IN BLOOD BY AUTOMATED COUNT: 12.9 % (ref 10–15)
FLUAV+FLUBV AG SPEC QL: NEGATIVE
FLUAV+FLUBV AG SPEC QL: NEGATIVE
GFR SERPL CREATININE-BSD FRML MDRD: 57 ML/MIN/{1.73_M2}
GLUCOSE SERPL-MCNC: 91 MG/DL (ref 70–99)
GLUCOSE UR STRIP-MCNC: NEGATIVE MG/DL
HCT VFR BLD AUTO: 37.9 % (ref 35–47)
HGB BLD-MCNC: 12.1 G/DL (ref 11.7–15.7)
HGB UR QL STRIP: NEGATIVE
IMM GRANULOCYTES # BLD: 0 10E9/L (ref 0–0.4)
IMM GRANULOCYTES NFR BLD: 0.2 %
KETONES UR STRIP-MCNC: NEGATIVE MG/DL
LACTATE BLD-SCNC: 0.9 MMOL/L (ref 0.7–2)
LEUKOCYTE ESTERASE UR QL STRIP: NEGATIVE
LYMPHOCYTES # BLD AUTO: 2.2 10E9/L (ref 0.8–5.3)
LYMPHOCYTES NFR BLD AUTO: 48 %
MCH RBC QN AUTO: 30.2 PG (ref 26.5–33)
MCHC RBC AUTO-ENTMCNC: 31.9 G/DL (ref 31.5–36.5)
MCV RBC AUTO: 95 FL (ref 78–100)
MONOCYTES # BLD AUTO: 0.5 10E9/L (ref 0–1.3)
MONOCYTES NFR BLD AUTO: 9.9 %
MUCOUS THREADS #/AREA URNS LPF: PRESENT /LPF
NEUTROPHILS # BLD AUTO: 1.8 10E9/L (ref 1.6–8.3)
NEUTROPHILS NFR BLD AUTO: 37.6 %
NITRATE UR QL: NEGATIVE
NRBC # BLD AUTO: 0 10*3/UL
NRBC BLD AUTO-RTO: 0 /100
PH UR STRIP: 7 PH (ref 5–7)
PLATELET # BLD AUTO: 155 10E9/L (ref 150–450)
POTASSIUM SERPL-SCNC: 4 MMOL/L (ref 3.4–5.3)
PROT SERPL-MCNC: 7.5 G/DL (ref 6.8–8.8)
RBC # BLD AUTO: 4.01 10E12/L (ref 3.8–5.2)
RBC #/AREA URNS AUTO: <1 /HPF (ref 0–2)
SODIUM SERPL-SCNC: 138 MMOL/L (ref 133–144)
SOURCE: ABNORMAL
SP GR UR STRIP: 1.01 (ref 1–1.03)
SPECIMEN SOURCE: NORMAL
SQUAMOUS #/AREA URNS AUTO: 1 /HPF (ref 0–1)
TSH SERPL DL<=0.005 MIU/L-ACNC: 2.77 MU/L (ref 0.4–4)
UROBILINOGEN UR STRIP-MCNC: NORMAL MG/DL (ref 0–2)
WBC # BLD AUTO: 4.7 10E9/L (ref 4–11)
WBC #/AREA URNS AUTO: <1 /HPF (ref 0–5)

## 2019-12-10 PROCEDURE — 96361 HYDRATE IV INFUSION ADD-ON: CPT | Performed by: EMERGENCY MEDICINE

## 2019-12-10 PROCEDURE — 99284 EMERGENCY DEPT VISIT MOD MDM: CPT | Mod: 25 | Performed by: EMERGENCY MEDICINE

## 2019-12-10 PROCEDURE — 87804 INFLUENZA ASSAY W/OPTIC: CPT | Performed by: EMERGENCY MEDICINE

## 2019-12-10 PROCEDURE — 87086 URINE CULTURE/COLONY COUNT: CPT | Performed by: EMERGENCY MEDICINE

## 2019-12-10 PROCEDURE — 71046 X-RAY EXAM CHEST 2 VIEWS: CPT

## 2019-12-10 PROCEDURE — 85025 COMPLETE CBC W/AUTO DIFF WBC: CPT | Performed by: EMERGENCY MEDICINE

## 2019-12-10 PROCEDURE — 99284 EMERGENCY DEPT VISIT MOD MDM: CPT | Mod: Z6 | Performed by: EMERGENCY MEDICINE

## 2019-12-10 PROCEDURE — 84443 ASSAY THYROID STIM HORMONE: CPT | Performed by: EMERGENCY MEDICINE

## 2019-12-10 PROCEDURE — 25000128 H RX IP 250 OP 636: Performed by: EMERGENCY MEDICINE

## 2019-12-10 PROCEDURE — 81001 URINALYSIS AUTO W/SCOPE: CPT | Performed by: EMERGENCY MEDICINE

## 2019-12-10 PROCEDURE — 96374 THER/PROPH/DIAG INJ IV PUSH: CPT | Performed by: EMERGENCY MEDICINE

## 2019-12-10 PROCEDURE — 80053 COMPREHEN METABOLIC PANEL: CPT | Performed by: EMERGENCY MEDICINE

## 2019-12-10 PROCEDURE — 83605 ASSAY OF LACTIC ACID: CPT | Performed by: EMERGENCY MEDICINE

## 2019-12-10 PROCEDURE — 25800030 ZZH RX IP 258 OP 636: Performed by: EMERGENCY MEDICINE

## 2019-12-10 RX ORDER — KETOROLAC TROMETHAMINE 15 MG/ML
15 INJECTION, SOLUTION INTRAMUSCULAR; INTRAVENOUS ONCE
Status: COMPLETED | OUTPATIENT
Start: 2019-12-10 | End: 2019-12-10

## 2019-12-10 RX ORDER — SODIUM CHLORIDE 9 MG/ML
1000 INJECTION, SOLUTION INTRAVENOUS CONTINUOUS
Status: DISCONTINUED | OUTPATIENT
Start: 2019-12-10 | End: 2019-12-10 | Stop reason: HOSPADM

## 2019-12-10 RX ADMIN — KETOROLAC TROMETHAMINE 15 MG: 15 INJECTION, SOLUTION INTRAMUSCULAR; INTRAVENOUS at 11:59

## 2019-12-10 RX ADMIN — SODIUM CHLORIDE 500 ML: 9 INJECTION, SOLUTION INTRAVENOUS at 11:59

## 2019-12-10 ASSESSMENT — ENCOUNTER SYMPTOMS
SLEEP DISTURBANCE: 1
APPETITE CHANGE: 1
SORE THROAT: 0
ABDOMINAL PAIN: 0
COUGH: 0
SHORTNESS OF BREATH: 0
ARTHRALGIAS: 1
MYALGIAS: 1
FEVER: 1

## 2019-12-10 NOTE — ED PROVIDER NOTES
"    [facility] EMERGENCY DEPARTMENT ([facility])    12/10/19        History     Chief Complaint   Patient presents with     Generalized Body Aches     ongoing x 3 days, getting worst, intermittent Fever, no cough,cold or sore throat     The history is provided by the patient, a relative and medical records. A  was used (Daughter interpreted Martiniquais).     Sonja Perez is a 74 year old female with a past medical history significant for asthma and previous H-pylori who presents here to the Emergency Department with her daughter due to myalgias and arthralgias. Patients daughter reports that the patient has been \"sick' for the past 3 weeks, however, the past 3 days has been worse. Patient is noting pain \"everywhere\", reports that she is having joint pain that is worse in her bilateral knees and ankles. Patient is currently endorsing a headache, sleep disturbance and a lack of appetite. Denies fevers currently but does report that she has been having subjective fevers. Denies shortness of breath, cough, sore throat or abdominal pain. Denies sick contacts.    I have reviewed the Medications, Allergies, Past Medical and Surgical History, and Social History in the [employer] system.    Past Medical History:   Diagnosis Date     History of Helicobacter pylori infection      Recurrent sinusitis 12/5/2018     Seasonal allergic rhinitis due to other allergic trigger 12/5/2018     Uncomplicated asthma        History reviewed. No pertinent surgical history.    History reviewed. No pertinent family history.    Social History     Tobacco Use     Smoking status: Never Smoker     Smokeless tobacco: Never Used   Substance Use Topics     Alcohol use: No       No current facility-administered medications for this encounter.      Current Outpatient Medications   Medication     FLOVENT  MCG/ACT inhaler     montelukast (SINGULAIR) 10 MG tablet     QVAR REDIHALER 80 MCG/ACT inhaler     VENTOLIN  (90 Base) " MCG/ACT inhaler     Acetaminophen (TYLENOL PO)     meclizine (ANTIVERT) 12.5 MG tablet     REFRESH OPTIVE ADVANCED 0.5-1-0.5 % SOLN        Allergies   Allergen Reactions     Oxybutynin Itching     Other reaction(s): Edema         Review of Systems   Constitutional: Positive for appetite change and fever (Subjective).   HENT: Negative for sore throat.    Respiratory: Negative for cough and shortness of breath.    Gastrointestinal: Negative for abdominal pain.   Musculoskeletal: Positive for arthralgias and myalgias.   Psychiatric/Behavioral: Positive for sleep disturbance.   All other systems reviewed and are negative.      Physical Exam   BP: 135/74  Pulse: 80  Temp: 97.8  F (36.6  C)  Resp: 16  Weight: 95.3 kg (210 lb)  SpO2: 100 %      Physical Exam  Constitutional:       Appearance: She is well-developed.   HENT:      Head: Normocephalic and atraumatic.   Neck:      Musculoskeletal: Normal range of motion.   Cardiovascular:      Rate and Rhythm: Normal rate and regular rhythm.      Heart sounds: Normal heart sounds.   Pulmonary:      Effort: Pulmonary effort is normal. No respiratory distress.      Breath sounds: Normal breath sounds.   Abdominal:      General: There is no distension.      Palpations: Abdomen is soft.      Tenderness: There is no abdominal tenderness. There is no rebound.   Musculoskeletal:         General: No swelling, tenderness, deformity or signs of injury.      Comments: Movement of all joints.  No erythema or edema noted in the knees ankles or wrists.   Skin:     General: Skin is warm and dry.   Neurological:      Mental Status: She is alert and oriented to person, place, and time.   Psychiatric:         Behavior: Behavior normal.         Thought Content: Thought content normal.         ED Course   9:59 AM  The patient was seen and examined by Wendi Clinton MD in Room ED07.        Procedures             Critical Care time:  none         Results for orders placed or performed during the  hospital encounter of 12/10/19   CBC with platelets differential     Status: None   Result Value Ref Range    WBC 4.7 4.0 - 11.0 10e9/L    RBC Count 4.01 3.8 - 5.2 10e12/L    Hemoglobin 12.1 11.7 - 15.7 g/dL    Hematocrit 37.9 35.0 - 47.0 %    MCV 95 78 - 100 fl    MCH 30.2 26.5 - 33.0 pg    MCHC 31.9 31.5 - 36.5 g/dL    RDW 12.9 10.0 - 15.0 %    Platelet Count 155 150 - 450 10e9/L    Diff Method Automated Method     % Neutrophils 37.6 %    % Lymphocytes 48.0 %    % Monocytes 9.9 %    % Eosinophils 3.9 %    % Basophils 0.4 %    % Immature Granulocytes 0.2 %    Nucleated RBCs 0 0 /100    Absolute Neutrophil 1.8 1.6 - 8.3 10e9/L    Absolute Lymphocytes 2.2 0.8 - 5.3 10e9/L    Absolute Monocytes 0.5 0.0 - 1.3 10e9/L    Absolute Eosinophils 0.2 0.0 - 0.7 10e9/L    Absolute Basophils 0.0 0.0 - 0.2 10e9/L    Abs Immature Granulocytes 0.0 0 - 0.4 10e9/L    Absolute Nucleated RBC 0.0    Comprehensive metabolic panel     Status: Abnormal   Result Value Ref Range    Sodium 138 133 - 144 mmol/L    Potassium 4.0 3.4 - 5.3 mmol/L    Chloride 107 94 - 109 mmol/L    Carbon Dioxide 27 20 - 32 mmol/L    Anion Gap 4 3 - 14 mmol/L    Glucose 91 70 - 99 mg/dL    Urea Nitrogen 11 7 - 30 mg/dL    Creatinine 0.97 0.52 - 1.04 mg/dL    GFR Estimate 57 (L) >60 mL/min/[1.73_m2]    GFR Estimate If Black 66 >60 mL/min/[1.73_m2]    Calcium 8.5 8.5 - 10.1 mg/dL    Bilirubin Total 0.7 0.2 - 1.3 mg/dL    Albumin 2.9 (L) 3.4 - 5.0 g/dL    Protein Total 7.5 6.8 - 8.8 g/dL    Alkaline Phosphatase 121 40 - 150 U/L    ALT 25 0 - 50 U/L    AST 35 0 - 45 U/L   Lactic acid whole blood     Status: None   Result Value Ref Range    Lactic Acid 0.9 0.7 - 2.0 mmol/L   TSH with free T4 reflex     Status: None   Result Value Ref Range    TSH 2.77 0.40 - 4.00 mU/L   UA with Microscopic     Status: Abnormal   Result Value Ref Range    Color Urine Light Yellow     Appearance Urine Clear     Glucose Urine Negative NEG^Negative mg/dL    Bilirubin Urine Negative  NEG^Negative    Ketones Urine Negative NEG^Negative mg/dL    Specific Gravity Urine 1.006 1.003 - 1.035    Blood Urine Negative NEG^Negative    pH Urine 7.0 5.0 - 7.0 pH    Protein Albumin Urine Negative NEG^Negative mg/dL    Urobilinogen mg/dL Normal 0.0 - 2.0 mg/dL    Nitrite Urine Negative NEG^Negative    Leukocyte Esterase Urine Negative NEG^Negative    Source Midstream Urine     WBC Urine <1 0 - 5 /HPF    RBC Urine <1 0 - 2 /HPF    Bacteria Urine Few (A) NEG^Negative /HPF    Squamous Epithelial /HPF Urine 1 0 - 1 /HPF    Mucous Urine Present (A) NEG^Negative /LPF   Urine Culture     Status: None (Preliminary result)   Result Value Ref Range    Specimen Description Midstream Urine     Special Requests Specimen received in preservative     Culture Micro PENDING    Influenza A/B antigen swab     Status: None   Result Value Ref Range    Influenza A/B Agn Specimen Nasopharyngeal     Influenza A Negative NEG^Negative    Influenza B Negative NEG^Negative     Medications   0.9% sodium chloride BOLUS (0 mLs Intravenous Stopped 12/10/19 1431)     Followed by   sodium chloride 0.9% infusion (has no administration in time range)   ketorolac (TORADOL) injection 15 mg (15 mg Intravenous Given 12/10/19 1159)            Labs Ordered and Resulted from Time of ED Arrival Up to the Time of Departure from the ED - No data to display         Assessments & Plan (with Medical Decision Making):  Patient is a 74-year-old female who presents to the ER due to myalgias and arthritis symptoms.  Patient has been having pain in her joints and her muscles.  Here we did a full evaluation including labs, a chest x-ray, and a UA.  Her laboratory and chest x-ray are all normal.  Patient received a dose of Toradol and is feeling better.  Unknown cause of patient's myalgias.  I feel her symptoms are likely from a viral infection she did have intermittent fever and some URI symptoms.  Since patient is feeling better she will be discharged home,  instructed to follow-up with her PCP for further care.  Patient remains well-appearing on exam.  This part of the medical record was transcribed by Wilbert Alfredo, Medical Scribe, from a dictation done by Wendi Clinton MD.      I have reviewed the nursing notes.    I have reviewed the findings, diagnosis, plan and need for follow up with the patient.    New Prescriptions    No medications on file       Final diagnoses:   Myalgia   Viral infection     I, Wilbert Alfredo, am serving as a trained medical scribe to document services personally performed by Wendi Clinton MD, based on the provider's statements to me.   I, Wendi Clinton MD, was physically present and have reviewed and verified the accuracy of this note documented by Wilbert Alfredo.    12/10/2019   [facility], [place], EMERGENCY DEPARTMENT     Wendi Clinton MD  12/10/19 8918

## 2019-12-10 NOTE — DISCHARGE INSTRUCTIONS
Your blood work and chest xray is normal.     Your urine test is normal.     Drink plenty of fluids and eat regularly.     Take tylenol or ibuprofen as needed for pain.     Please make an appointment to follow up with Your Primary Care Provider in 3-4 days even if entirely better.

## 2019-12-10 NOTE — ED AVS SNAPSHOT
[facility], [place], Emergency Department  [address] [place] [address]  Phone:  [phone]  Fax:  [phone]                                    Sonja Perez   MRN: [id number]    Department:  [facility], Emergency Department   Date of Visit:  12/10/2019           After Visit Summary Signature Page    I have received my discharge instructions, and my questions have been answered. I have discussed any challenges I see with this plan with the nurse or doctor.    ..........................................................................................................................................  Patient/Patient Representative Signature      ..........................................................................................................................................  Patient Representative Print Name and Relationship to Patient    ..................................................               ................................................  Date                                   Time    ..........................................................................................................................................  Reviewed by Signature/Title    ...................................................              ..............................................  Date                                               Time          22EPIC Rev 08/18

## 2019-12-10 NOTE — ED TRIAGE NOTES
Patient has ongoing Generalized body ache x 3 days, getting worst, intermittent Fever, no cough,cold or sore throat. NO nausea , vomiting or diarrhea reported.

## 2019-12-11 LAB
BACTERIA SPEC CULT: NORMAL
Lab: NORMAL
SPECIMEN SOURCE: NORMAL

## 2021-02-22 ENCOUNTER — OFFICE VISIT (OUTPATIENT)
Dept: FAMILY MEDICINE | Facility: CLINIC | Age: 76
End: 2021-02-22
Payer: COMMERCIAL

## 2021-02-22 VITALS
TEMPERATURE: 98.2 F | DIASTOLIC BLOOD PRESSURE: 80 MMHG | BODY MASS INDEX: 38.62 KG/M2 | SYSTOLIC BLOOD PRESSURE: 130 MMHG | WEIGHT: 218 LBS | HEART RATE: 80 BPM | OXYGEN SATURATION: 98 % | HEIGHT: 63 IN

## 2021-02-22 DIAGNOSIS — J30.89 SEASONAL ALLERGIC RHINITIS DUE TO OTHER ALLERGIC TRIGGER: ICD-10-CM

## 2021-02-22 DIAGNOSIS — Z00.01 ENCOUNTER FOR GENERAL ADULT MEDICAL EXAMINATION WITH ABNORMAL FINDINGS: Primary | ICD-10-CM

## 2021-02-22 DIAGNOSIS — N18.30 STAGE 3 CHRONIC KIDNEY DISEASE, UNSPECIFIED WHETHER STAGE 3A OR 3B CKD (H): ICD-10-CM

## 2021-02-22 DIAGNOSIS — Z11.59 NEED FOR HEPATITIS C SCREENING TEST: ICD-10-CM

## 2021-02-22 DIAGNOSIS — N39.41 URGE INCONTINENCE OF URINE: ICD-10-CM

## 2021-02-22 DIAGNOSIS — R30.0 DYSURIA: ICD-10-CM

## 2021-02-22 DIAGNOSIS — Z13.220 SCREENING FOR HYPERLIPIDEMIA: ICD-10-CM

## 2021-02-22 DIAGNOSIS — Z78.0 ASYMPTOMATIC MENOPAUSAL STATE: ICD-10-CM

## 2021-02-22 DIAGNOSIS — I87.2 VENOUS (PERIPHERAL) INSUFFICIENCY: ICD-10-CM

## 2021-02-22 LAB
ALBUMIN UR-MCNC: NEGATIVE MG/DL
ANION GAP SERPL CALCULATED.3IONS-SCNC: 3 MMOL/L (ref 3–14)
APPEARANCE UR: CLEAR
BASOPHILS # BLD AUTO: 0 10E9/L (ref 0–0.2)
BASOPHILS NFR BLD AUTO: 0.4 %
BILIRUB UR QL STRIP: NEGATIVE
BUN SERPL-MCNC: 18 MG/DL (ref 7–30)
CALCIUM SERPL-MCNC: 9.1 MG/DL (ref 8.5–10.1)
CHLORIDE SERPL-SCNC: 107 MMOL/L (ref 94–109)
CHOLEST SERPL-MCNC: 163 MG/DL
CO2 SERPL-SCNC: 27 MMOL/L (ref 20–32)
COLOR UR AUTO: YELLOW
CREAT SERPL-MCNC: 0.99 MG/DL (ref 0.52–1.04)
DIFFERENTIAL METHOD BLD: NORMAL
EOSINOPHIL # BLD AUTO: 0.2 10E9/L (ref 0–0.7)
EOSINOPHIL NFR BLD AUTO: 3.2 %
ERYTHROCYTE [DISTWIDTH] IN BLOOD BY AUTOMATED COUNT: 13 % (ref 10–15)
GFR SERPL CREATININE-BSD FRML MDRD: 55 ML/MIN/{1.73_M2}
GLUCOSE SERPL-MCNC: 88 MG/DL (ref 70–99)
GLUCOSE UR STRIP-MCNC: NEGATIVE MG/DL
HCT VFR BLD AUTO: 37.4 % (ref 35–47)
HDLC SERPL-MCNC: 70 MG/DL
HGB BLD-MCNC: 12 G/DL (ref 11.7–15.7)
HGB UR QL STRIP: NEGATIVE
KETONES UR STRIP-MCNC: NEGATIVE MG/DL
LDLC SERPL CALC-MCNC: 81 MG/DL
LEUKOCYTE ESTERASE UR QL STRIP: NEGATIVE
LYMPHOCYTES # BLD AUTO: 1.3 10E9/L (ref 0.8–5.3)
LYMPHOCYTES NFR BLD AUTO: 27.4 %
MCH RBC QN AUTO: 30.2 PG (ref 26.5–33)
MCHC RBC AUTO-ENTMCNC: 32.1 G/DL (ref 31.5–36.5)
MCV RBC AUTO: 94 FL (ref 78–100)
MONOCYTES # BLD AUTO: 0.5 10E9/L (ref 0–1.3)
MONOCYTES NFR BLD AUTO: 9.6 %
NEUTROPHILS # BLD AUTO: 2.8 10E9/L (ref 1.6–8.3)
NEUTROPHILS NFR BLD AUTO: 59.4 %
NITRATE UR QL: NEGATIVE
NONHDLC SERPL-MCNC: 93 MG/DL
PH UR STRIP: 6 PH (ref 5–7)
PLATELET # BLD AUTO: 178 10E9/L (ref 150–450)
POTASSIUM SERPL-SCNC: 4.6 MMOL/L (ref 3.4–5.3)
RBC # BLD AUTO: 3.97 10E12/L (ref 3.8–5.2)
SODIUM SERPL-SCNC: 137 MMOL/L (ref 133–144)
SOURCE: NORMAL
SP GR UR STRIP: 1.01 (ref 1–1.03)
TRIGL SERPL-MCNC: 61 MG/DL
UROBILINOGEN UR STRIP-ACNC: 0.2 EU/DL (ref 0.2–1)
WBC # BLD AUTO: 4.7 10E9/L (ref 4–11)

## 2021-02-22 PROCEDURE — 86803 HEPATITIS C AB TEST: CPT | Performed by: NURSE PRACTITIONER

## 2021-02-22 PROCEDURE — 99397 PER PM REEVAL EST PAT 65+ YR: CPT | Performed by: NURSE PRACTITIONER

## 2021-02-22 PROCEDURE — 80061 LIPID PANEL: CPT | Performed by: NURSE PRACTITIONER

## 2021-02-22 PROCEDURE — 99213 OFFICE O/P EST LOW 20 MIN: CPT | Mod: 25 | Performed by: NURSE PRACTITIONER

## 2021-02-22 PROCEDURE — 85025 COMPLETE CBC W/AUTO DIFF WBC: CPT | Performed by: NURSE PRACTITIONER

## 2021-02-22 PROCEDURE — 87522 HEPATITIS C REVRS TRNSCRPJ: CPT | Performed by: NURSE PRACTITIONER

## 2021-02-22 PROCEDURE — 80048 BASIC METABOLIC PNL TOTAL CA: CPT | Performed by: NURSE PRACTITIONER

## 2021-02-22 PROCEDURE — 36415 COLL VENOUS BLD VENIPUNCTURE: CPT | Performed by: NURSE PRACTITIONER

## 2021-02-22 PROCEDURE — 81003 URINALYSIS AUTO W/O SCOPE: CPT | Performed by: NURSE PRACTITIONER

## 2021-02-22 ASSESSMENT — ENCOUNTER SYMPTOMS
JOINT SWELLING: 1
ARTHRALGIAS: 1
HEADACHES: 1
PALPITATIONS: 0
FEVER: 0
NERVOUS/ANXIOUS: 0
WEAKNESS: 1
DIZZINESS: 1
MYALGIAS: 1
CONSTIPATION: 0
DIARRHEA: 0
CHILLS: 1
PARESTHESIAS: 0
SORE THROAT: 0
SHORTNESS OF BREATH: 0
BREAST MASS: 0
COUGH: 1
HEMATURIA: 0
ABDOMINAL PAIN: 0
HEARTBURN: 1
DYSURIA: 1
EYE PAIN: 1
HEMATOCHEZIA: 0

## 2021-02-22 ASSESSMENT — ACTIVITIES OF DAILY LIVING (ADL): CURRENT_FUNCTION: NO ASSISTANCE NEEDED

## 2021-02-22 ASSESSMENT — MIFFLIN-ST. JEOR: SCORE: 1444.09

## 2021-02-22 NOTE — PATIENT INSTRUCTIONS
Patient Education   Personalized Prevention Plan  You are due for the preventive services outlined below.  Your care team is available to assist you in scheduling these services.  If you have already completed any of these items, please share that information with your care team to update in your medical record.  Health Maintenance Due   Topic Date Due     Osteoporosis Screening  1945     Discuss Advance Care Planning  1945     COVID-19 Vaccine (1 of 2) 10/15/1959     Hepatitis C Screening  10/15/1961     Diptheria Tetanus Pertussis (DTAP/TDAP/TD) Vaccine (1 - Tdap) 10/15/1968     Cholesterol Lab  10/15/1988     Zoster (Shingles) Vaccine (1 of 2) 10/15/1993     Annual Wellness Visit  10/15/2008     FALL RISK ASSESSMENT  10/15/2008     Pneumococcal Vaccine (1 of 1 - PPSV23) 10/15/2008     PHQ-2  01/01/2021        Patient Education   Your Health Risk Assessment indicates you feel you are not in good health    A healthy lifestyle helps keep the body fit and the mind alert. It helps protect you from disease, helps you fight disease, and helps prevent chronic disease (disease that doesn't go away) from getting worse. This is important as you get older and begin to notice twinges in muscles and joints and a decline in the strength and stamina you once took for granted. A healthy lifestyle includes good healthcare, good nutrition, weight control, recreation, and regular exercise. Avoid harmful substances and do what you can to keep safe. Another part of a healthy lifestyle is stay mentally active and socially involved.    Good healthcare     Have a wellness visit every year.     If you have new symptoms, let us know right away. Don't wait until the next checkup.     Take medicines exactly as prescribed and keep your medicines in a safe place. Tell us if your medicine causes problems.   Healthy diet and weight control     Eat 3 or 4 small, nutritious, low-fat, high-fiber meals a day. Include a variety of  fruits, vegetables, and whole-grain foods.     Make sure you get enough calcium in your diet. Calcium, vitamin D, and exercise help prevent osteoporosis (bone thinning).     If you live alone, try eating with others when you can. That way you get a good meal and have company while you eat it.     Try to keep a healthy weight. If you eat more calories than your body uses for energy, it will be stored as fat and you will gain weight.     Recreation   Recreation is not limited to sports and team events. It includes any activity that provides relaxation, interest, enjoyment, and exercise. Recreation provides an outlet for physical, mental, and social energy. It can give a sense of worth and achievement. It can help you stay healthy.    Mental Exercise and Social Involvement  Mental and emotional health is as important as physical health. Keep in touch with friends and family. Stay as active as possible. Continue to learn and challenge yourself.   Things you can do to stay mentally active are:    Learn something new, like a foreign language or musical instrument.     Play S[employer]ABBLE or do crossword puzzles. If you cannot find people to play these games with you at home, you can play them with others on your computer through the Internet.     Join a games club--anything from card games to chess or checkers or lawn bowling.     Start a new hobby.     Go back to school.     Volunteer.     Read.   Keep up with world events.    Exercise for a Healthier Heart     Exercise with a friend. When activity is fun, you're more likely to stick with it.   You may wonder how you can improve the health of your heart. If you re thinking about exercise, you re on the right track. You don t need to become an athlete. But you do need a certain amount of brisk exercise to help strengthen your heart. If you have been diagnosed with a heart condition, your healthcare provider may advise exercise to help stabilize your condition. To help make  exercise a habit, choose safe, fun activities.   Before you start  Check with your healthcare provider before starting an exercise program. This is especially important if you have not been active for a while. It's also important if you have a long-term (chronic) health problem such as heart disease, diabetes, or obesity. Or if you are at high risk for having these problems.   Why exercise?  Exercising regularly offers many healthy rewards. It can help you do all of the following:    Improve your blood cholesterol level to help prevent further heart trouble    Lower your blood pressure to help prevent a stroke or heart attack    Control diabetes, or reduce your risk of getting this disease    Improve your heart and lung function    Reach and stay at a healthy weight    Make your muscles stronger so you can stay active    Prevent falls and fractures by slowing the loss of bone mass (osteoporosis)    Manage stress better    Reduce your blood pressure    Improve your sense of self and your body image  Exercise tips      Ease into your routine. Set small goals. Then build on them. If you are not sure what your activity level should be, talk with your healthcare provider first before starting an exercise routine.    Exercise on most days. Aim for a total of 150 minutes (2 hours and 30 minutes) or more of moderate-intensity aerobic activity each week. Or 75 minutes (1 hour and 15 minutes) or more of vigorous-intensity aerobic activity each week. Or try for a combination of both. Moderate activity means that you breathe heavier and your heart rate increases but you can still talk. Think about doing 40 minutes of moderate exercise, 3 to 4 times a week. For best results, activity should last for about 40 minutes to lower blood pressure and cholesterol. It's OK to work up to the 40-minute period over time. Examples of moderate-intensity activity are walking 1 mile in 15 minutes. Or doing 30 to 45 minutes of yard work.    Step  up your daily activity level.  Along with your exercise program, try being more active the whole day. Walk instead of drive. Or park further away so that you take more steps each day. Do more household tasks or yard work. You may not be able to meet the advised mount of physical activity. But doing some moderate- or vigorous-intensity aerobic activity can help reduce your risk for heart disease. Your healthcare provider can help you figure out what is best for you.    Choose 1 or more activities you enjoy.  Walking is one of the easiest things you can do. You can also try swimming, riding a bike, dancing, or taking an exercise class.    When to call your healthcare provider  Call your healthcare provider if you have any of these:     Chest pain or feel dizzy or lightheaded    Burning, tightness, pressure, or heaviness in your chest, neck, shoulders, back, or arms    Abnormal shortness of breath    More joint or muscle pain    A very fast or irregular heartbeat (palpitations)  "[employer] last reviewed this educational content on 7/1/2019 2000-2020 [employer]. [address] [place] [address]. All rights reserved. This information is not intended as a substitute for professional medical care. Always follow your healthcare professional's instructions.          Urinary Incontinence, Female (Adult)   Urinary incontinence means loss of bladder control. This problem affects many women, especially as they get older. If you have incontinence, you may be embarrassed to ask for help. But know that this problem can be treated.   Types of Incontinence  There are different types of incontinence. Two of the main types are described here. You can have more than one type.     Stress incontinence. With this type, urine leaks when pressure (stress) is put on the bladder. This may happen when you cough, sneeze, or laugh. Stress incontinence most often occurs because the pelvic floor muscles that support the  bladder and urethra are weak. This can happen after pregnancy and vaginal childbirth or a hysterectomy. It can also be due to excess body weight or hormone changes.    Urge incontinence (also called overactive bladder). With this type, a sudden urge to urinate is felt often. This may happen even though there may not be much urine in the bladder. The need to urinate often during the night is common. Urge incontinence most often occurs because of bladder spasms. This may be due to bladder irritation or infection. Damage to bladder nerves or pelvic muscles, constipation, and certain medicines can also lead to urge incontinence.  Treatment depends on the cause. Further evaluation is needed to find the type you have. This will likely include an exam and certain tests. Based on the results, you and your healthcare provider can then plan treatment. Until a diagnosis is made, the home care tips below can help ease symptoms.   Home care    Do pelvic floor muscle exercises, if they are prescribed. The pelvic floor muscles help support the bladder and urethra. Many women find that their symptoms improve when doing special exercises that strengthen these muscles. To do the exercises, contract the muscles you would use to stop your stream of urine. But do this when you re not urinating. Hold for 10 seconds, then relax. Repeat 10 to 20 times in a row, at least 3 times a day. Your healthcare provider may give you other instructions for how to do the exercises and how often.    Keep a bladder diary. This helps track how often and how much you urinate over a set period of time. Bring this diary with you to your next visit with the provider. The information can help your provider learn more about your bladder problem.    Lose weight, if advised to by your provider. Extra weight puts pressure on the bladder. Your provider can help you create a weight-loss plan that s right for you. This may include exercising more and making certain  diet changes.    Don't have foods and drinks that may irritate the bladder. These can include alcohol and caffeinated drinks.    Quit smoking. Smoking and other tobacco use can lead to a long-term (chronic) cough that strains the pelvic floor muscles. Smoking may also damage the bladder and urethra. Talk with your provider about treatments or methods you can use to quit smoking.    If drinking large amounts of fluid makes you have symptoms, you may be advised to limit your fluid intake. You may also be advised to drink most of your fluids during the day and to limit fluids at night.    If you re worried about urine leakage or accidents, you may wear absorbent pads to catch urine. Change the pads often. This helps reduce discomfort. It may also reduce the risk of skin or bladder infections.    Follow-up care  Follow up with your healthcare provider, or as directed. It may take some to find the right treatment for your problem. But healthy lifestyle changes can be made right away. These include such things as exercising on a regular basis, eating a healthy diet, losing weight (if needed), and quitting smoking. Your treatment plan may include special therapies or medicines. Certain procedures or surgery may also be options. Talk about any questions you have with your provider.   When to seek medical advice  Call the healthcare provider right away if any of these occur:    Fever of 100.4 F (38 C) or higher, or as directed by your provider    Bladder pain or fullness    Belly swelling    Nausea or vomiting    Back pain    Weakness, dizziness, or fainting  [employer] last reviewed this educational content on 1/1/2020 2000-2020 [employer]. [address] [place] [address], [place] [address]. All rights reserved. This information is not intended as a substitute for professional medical care. Always follow your healthcare professional's instructions.                Patient Education     Coping with Edema  What is  edema?  Edema is the build-up of fluid in the body, which causes swelling. Swelling most commonly occurs in the feet, ankles, lower legs or hands.  Swelling can occur in the belly or chest may be a sign of a more severe problem.  Certain medicines or conditions can make the swelling worse.  Symptoms include:    Feet and lower legs get larger when you sit or walk.    Hands feel tight when you make a fist.    When you push on the skin, skin stays dented.    Shiny, tight skin.    Fast weight gain.  How is it treated?  Your care team may give you a medicine to reduce the swelling.  They may also suggest that you meet with a dietitian. He or she can help with food choices to reduce the swelling.  What can I do about the swelling?    Place your feet above your heart 3 times a day: Sit with your feet up on a stool with a pillow. Sit on the bed or couch with two pillows under your feet.    Do not stand for long periods of time.    Wear loose-fitting clothes.    Do not cross your legs.    Reduce the salt in your diet. These foods are high in salt:  ? Chips, soup  ? Frozen meals, TV dinners  ? Frances, lunch meat, ham  ? Sauces (soy, canned spaghetti sauce)    Walk or do other exercise.    Wear compression stockings.    Drink water as normal.    Weigh yourself every day at the same time to keep track of weight gain.  When should I call my care team?  Call your care team if:    You have a hard time breathing.    You gained 5 pounds or more in 1 week.    Your hands or feet feel cold when you touch them.    You are peeing very little or not at all.    Swelling is moving up your arms or legs.    Your tongue is swelling.    You cannot eat for more than a day  If you have any side effects, call us. We can help you manage these problems.  For more information,  see:  www.[facility].com  www.cancer.org/treatment/treatmentsandsideeffects/physicalsideeffects/dealingwithsymptomsathome  www.cancer.gov/cancertopics/coping/chemotherapy-and-you  Comments:  __________________________________________  __________________________________________  __________________________________________  __________________________________________  __________________________________________  __________________________________________  __________________________________________  For informational purposes only. Not to replace the advice of your health care provider.  Copyright   2014 [employer]. All rights reserved. [employer] 784868 - REV 03/16.

## 2021-02-22 NOTE — PROGRESS NOTES
"SUBJECTIVE:   Sonja Perez is a 75 year old female who presents for Preventive Visit with her daughter as  per patient request.     Patient has been advised of split billing requirements and indicates understanding: Yes   Are you in the first 12 months of your Medicare coverage?  No    Healthy Habits:     In general, how would you rate your overall health?  Fair    Frequency of exercise:  None    Do you usually eat at least 4 servings of fruit and vegetables a day, include whole grains    & fiber and avoid regularly eating high fat or \"junk\" foods?  Yes    Taking medications regularly:  Yes    Medication side effects:  None    Ability to successfully perform activities of daily living:  No assistance needed    Home Safety:  No safety concerns identified    Hearing Impairment:  No hearing concerns    In the past 6 months, have you been bothered by leaking of urine? Yes    In general, how would you rate your overall mental or emotional health?  Good      PHQ-2 Total Score: 0    Additional concerns today:  Yes (Urinary concern burning and urge, joint pain all over body)    Do you feel safe in your environment? Yes    Have you ever done Advance Care Planning? (For example, a Health Directive, POLST, or a discussion with a medical provider or your loved ones about your wishes): No, advance care planning information given to patient to review.  Patient declined advance care planning discussion at this time.     Fall risk  Fallen 2 or more times in the past year?: No  Any fall with injury in the past year?: No    Cognitive Screening: Unable to complete language barrier and unable to draw clock    Do you have sleep apnea, excessive snoring or daytime drowsiness?: no    Reviewed and updated as needed this visit by clinical staff  Tobacco  Allergies  Meds              Reviewed and updated as needed this visit by Provider  Tobacco  Allergies  Meds  Problems  Med Hx  Surg Hx  Fam Hx         Social " History     Tobacco Use     Smoking status: Never Smoker     Smokeless tobacco: Never Used   Substance Use Topics     Alcohol use: No     If you drink alcohol do you typically have >3 drinks per day or >7 drinks per week? No    Alcohol Use 2/22/2021   Prescreen: >3 drinks/day or >7 drinks/week? Not Applicable   No flowsheet data found.    URINARY TRACT SYMPTOMS      Duration: years, worsened past few months    Description  urgency and incontinence    Intensity:  moderate    Accompanying signs and symptoms:  Fever/chills: no   Flank pain no   Nausea and vomiting: no   Vaginal symptoms: none  Abdominal/Pelvic Pain: no     History  History of frequent UTI's: no   History of kidney stones: no   Sexually Active: no   Possibility of pregnancy: No    Precipitating or alleviating factors: None    Therapies tried and outcome: oxybutynin Outcome: caused swelling and itching    Seasonal allergic rhinitis and asthma:  States that her symptoms are worsening. States that she is taking an inhaler but does not know the name and it is not helpful.     States that she has swelling in her lower legs for years which is better in the mornings and worse later in the day.     Current providers sharing in care for this patient include:   Patient Care Team:  Sonia Perez MD as PCP - General (Family Practice)  Stef Robbins MD as Assigned PCP    The following health maintenance items are reviewed in Epic and correct as of today:  Health Maintenance   Topic Date Due     DEXA  1945     ADVANCE CARE PLANNING  1945     COVID-19 Vaccine (1 of 2) 10/15/1959     HEPATITIS C SCREENING  10/15/1961     LIPID  10/15/1988     ZOSTER IMMUNIZATION (1 of 2) 10/15/1993     MEDICARE ANNUAL WELLNESS VISIT  10/15/2008     FALL RISK ASSESSMENT  10/15/2008     DTAP/TDAP/TD IMMUNIZATION (2 - Td) 09/09/2024     PHQ-2  Completed     INFLUENZA VACCINE  Completed     Pneumococcal Vaccine: 65+ Years  Completed     Pneumococcal Vaccine:  "Pediatrics (0 to 5 Years) and At-Risk Patients (6 to 64 Years)  Aged Out     IPV IMMUNIZATION  Aged Out     MENINGITIS IMMUNIZATION  Aged Out     HEPATITIS B IMMUNIZATION  Aged Out     Lab work is in process  Mammogram Screening - Patient over age 75, has elected to discontinue screenings.    Review of Systems   Constitutional: Positive for chills. Negative for fever.   HENT: Negative for congestion, ear pain, hearing loss and sore throat.    Eyes: Positive for pain. Negative for visual disturbance.   Respiratory: Positive for cough. Negative for shortness of breath.    Cardiovascular: Positive for peripheral edema. Negative for chest pain and palpitations.   Gastrointestinal: Positive for heartburn. Negative for abdominal pain, constipation, diarrhea and hematochezia.   Breasts:  Negative for tenderness, breast mass and discharge.   Genitourinary: Positive for dysuria. Negative for genital sores, hematuria, pelvic pain, urgency, vaginal bleeding and vaginal discharge.   Musculoskeletal: Positive for arthralgias, joint swelling and myalgias.   Skin: Negative for rash.   Neurological: Positive for dizziness, weakness and headaches. Negative for paresthesias.   Psychiatric/Behavioral: Negative for mood changes. The patient is not nervous/anxious.        OBJECTIVE:   BP (!) 144/82   Pulse 80   Temp 98.2  F (36.8  C) (Oral)   Ht 1.602 m (5' 3.07\")   Wt 98.9 kg (218 lb)   SpO2 98%   BMI 38.53 kg/m   Estimated body mass index is 38.53 kg/m  as calculated from the following:    Height as of this encounter: 1.602 m (5' 3.07\").    Weight as of this encounter: 98.9 kg (218 lb).  Physical Exam  GENERAL APPEARANCE: healthy, alert and no distress  EYES: Eyes grossly normal to inspection, PERRL and conjunctivae and sclerae normal  HENT: ear canals and TM's normal, nose and mouth without ulcers or lesions, oropharynx clear and oral mucous membranes moist  NECK: no adenopathy, no asymmetry, masses, or scars and thyroid normal " to palpation  RESP: lungs clear to auscultation - no rales, rhonchi or wheezes  CV: regular rate and rhythm, normal S1 S2, no S3 or S4, no murmur, click or rub, no peripheral edema and peripheral pulses strong  ABDOMEN: soft, nontender, no hepatosplenomegaly, no masses and bowel sounds normal  MS: no musculoskeletal defects are noted and gait is age appropriate without ataxia  SKIN: no suspicious lesions or rashes  NEURO: Normal strength and tone, sensory exam grossly normal, mentation intact and speech normal  PSYCH: mentation appears normal and affect normal/bright    Diagnostic Test Results:  Labs reviewed in Epic  Results for orders placed or performed in visit on 02/22/21 (from the past 24 hour(s))   UA reflex to Microscopic and Culture    Specimen: Midstream Urine   Result Value Ref Range    Color Urine Yellow     Appearance Urine Clear     Glucose Urine Negative NEG^Negative mg/dL    Bilirubin Urine Negative NEG^Negative    Ketones Urine Negative NEG^Negative mg/dL    Specific Gravity Urine 1.010 1.003 - 1.035    Blood Urine Negative NEG^Negative    pH Urine 6.0 5.0 - 7.0 pH    Protein Albumin Urine Negative NEG^Negative mg/dL    Urobilinogen Urine 0.2 0.2 - 1.0 EU/dL    Nitrite Urine Negative NEG^Negative    Leukocyte Esterase Urine Negative NEG^Negative    Source Midstream Urine        ASSESSMENT / PLAN:   1. Encounter for general adult medical examination with abnormal findings  - Vitamin D Deficiency; Future  - CBC with platelets differential    2. Stage 3 chronic kidney disease, unspecified whether stage 3a or 3b CKD  Counseled on avoiding NSAIDs. Acetaminophen is okay if something is needed for occasional pain.   - Basic metabolic panel    3. Seasonal allergic rhinitis due to other allergic trigger  Patient states that symptoms are worsening but does not know what medications and inhalers she takes. Will bring medications to follow up.     4. Dysuria  - UA reflex to Microscopic and Culture    5. Need  "for hepatitis C screening test  - Hepatitis C Screen Reflex to HCV RNA Quant and Genotype    6. Screening for hyperlipidemia  - Lipid panel reflex to direct LDL Fasting    7. Urge incontinence of urine  Offered patient physical therapy, declines. Patient would like to discuss with specialist as she has had bad reaction to medication for this (oxybutynin) in the past.   - UROLOGY ADULT REFERRAL; Future    8. Venous (peripheral) insufficiency  - Compression Sleeve/Stocking Order for DME - ONLY FOR DME  - CBC with platelets differential    9. Asymptomatic menopausal state  - DEXA HIP/PELVIS/SPINE - Future; Future  - Vitamin D Deficiency; Future    10. Body mass index (BMI) 38.0-38.9, adult   - Vitamin D Deficiency; Future  - CBC with platelets differential    Patient has been advised of split billing requirements and indicates understanding: Yes  COUNSELING:  Reviewed preventive health counseling, as reflected in patient instructions       Regular exercise       Healthy diet/nutrition       Vision screening       Bladder control       Osteoporosis prevention/bone health       Hepatitis C screening    Estimated body mass index is 38.53 kg/m  as calculated from the following:    Height as of this encounter: 1.602 m (5' 3.07\").    Weight as of this encounter: 98.9 kg (218 lb).    Weight management plan: Discussed healthy diet and exercise guidelines    She reports that she has never smoked. She has never used smokeless tobacco.      Appropriate preventive services were discussed with this patient, including applicable screening as appropriate for cardiovascular disease, diabetes, osteopenia/osteoporosis, and glaucoma.  As appropriate for age/gender, discussed screening for colorectal cancer, prostate cancer, breast cancer, and cervical cancer. Checklist reviewing preventive services available has been given to the patient.    Reviewed patients plan of care and provided an AVS. The Basic Care Plan (routine screening as " documented in Health Maintenance) for Sonja meets the Care Plan requirement. This Care Plan has been established and reviewed with the Patient and daughter.      The patient was provided with suggestions to help her develop a healthy physical lifestyle.  She is at risk for lack of exercise and has been provided with information to increase physical activity for the benefit of her well-being.  Information on urinary incontinence and treatment options given to patient.    Counseling Resources:  ATP IV Guidelines  Pooled Cohorts Equation Calculator  Breast Cancer Risk Calculator  Breast Cancer: Medication to Reduce Risk  FRAX Risk Assessment  ICSI Preventive Guidelines  Dietary Guidelines for Americans, 2010  USDA's MyPlate  ASA Prophylaxis  Lung CA Screening    NICOLLE Hurtado CNP  [facility]    Identified Health Risks:

## 2021-02-22 NOTE — LETTER
February 23, 2021      Elyserody Perez  [address]   [facility] [address]        Dear ,    We are writing to inform you of your test results.    Your hepatitis C screen is positive so we will test the blood you gave for active infection and I will let you know as soon as we get results.     Your cholesterol/lipids are good.     Your kidney function is still a little reduced but stable. Avoid NSAIDs such as Advil, ibuprofen, and Aleve. Tylenol (acetaminophen) is okay if something is needed for occasional pain. Your electrolytes including glucose are good.     Your complete blood counts are good.       Resulted Orders   Hepatitis C Screen Reflex to HCV RNA Quant and Genotype   Result Value Ref Range    Hepatitis C Antibody Reactive (A) NR^Nonreactive      Comment:      A reactive result indicates one of the following 1) current HCV infection 2)   past HCV infection that has resolved or 3) false positivity. The CDC   recommends that a reactive result should be followed by Nucleic acid testing   for HCV RNA.   If HCV RNA is detected, that indicates current HCV infection. If HCV RNA is   not detected, that indicates either past, resolved HCV infection, or false HCV   antibody positivity.  Assay performance characteristics have not been established for newborns,   infants, and children     Lipid panel reflex to direct LDL Fasting   Result Value Ref Range    Cholesterol 163 <200 mg/dL    Triglycerides 61 <150 mg/dL    HDL Cholesterol 70 >49 mg/dL    LDL Cholesterol Calculated 81 <100 mg/dL      Comment:      Desirable:       <100 mg/dl    Non HDL Cholesterol 93 <130 mg/dL   UA reflex to Microscopic and Culture   Result Value Ref Range    Color Urine Yellow     Appearance Urine Clear     Glucose Urine Negative NEG^Negative mg/dL    Bilirubin Urine Negative NEG^Negative    Ketones Urine Negative NEG^Negative mg/dL    Specific Gravity Urine 1.010 1.003 - 1.035    Blood Urine Negative NEG^Negative     pH Urine 6.0 5.0 - 7.0 pH    Protein Albumin Urine Negative NEG^Negative mg/dL    Urobilinogen Urine 0.2 0.2 - 1.0 EU/dL    Nitrite Urine Negative NEG^Negative    Leukocyte Esterase Urine Negative NEG^Negative    Source Midstream Urine    Basic metabolic panel   Result Value Ref Range    Sodium 137 133 - 144 mmol/L    Potassium 4.6 3.4 - 5.3 mmol/L    Chloride 107 94 - 109 mmol/L    Carbon Dioxide 27 20 - 32 mmol/L    Anion Gap 3 3 - 14 mmol/L    Glucose 88 70 - 99 mg/dL    Urea Nitrogen 18 7 - 30 mg/dL    Creatinine 0.99 0.52 - 1.04 mg/dL    GFR Estimate 55 (L) >60 mL/min/[1.73_m2]      Comment:      Non  GFR Calc  Starting 12/18/2018, serum creatinine based estimated GFR (eGFR) will be   calculated using the Chronic Kidney Disease Epidemiology Collaboration   (CKD-EPI) equation.      GFR Estimate If Black 64 >60 mL/min/[1.73_m2]      Comment:       GFR Calc  Starting 12/18/2018, serum creatinine based estimated GFR (eGFR) will be   calculated using the Chronic Kidney Disease Epidemiology Collaboration   (CKD-EPI) equation.      Calcium 9.1 8.5 - 10.1 mg/dL   CBC with platelets differential   Result Value Ref Range    WBC 4.7 4.0 - 11.0 10e9/L    RBC Count 3.97 3.8 - 5.2 10e12/L    Hemoglobin 12.0 11.7 - 15.7 g/dL    Hematocrit 37.4 35.0 - 47.0 %    MCV 94 78 - 100 fl    MCH 30.2 26.5 - 33.0 pg    MCHC 32.1 31.5 - 36.5 g/dL    RDW 13.0 10.0 - 15.0 %    Platelet Count 178 150 - 450 10e9/L    % Neutrophils 59.4 %    % Lymphocytes 27.4 %    % Monocytes 9.6 %    % Eosinophils 3.2 %    % Basophils 0.4 %    Absolute Neutrophil 2.8 1.6 - 8.3 10e9/L    Absolute Lymphocytes 1.3 0.8 - 5.3 10e9/L    Absolute Monocytes 0.5 0.0 - 1.3 10e9/L    Absolute Eosinophils 0.2 0.0 - 0.7 10e9/L    Absolute Basophils 0.0 0.0 - 0.2 10e9/L    Diff Method Automated Method        If you have any questions or concerns, please call the clinic at the number listed above.       Sincerely,      Sri Briggs,  APRN CNP/blt

## 2021-02-22 NOTE — LETTER
March 29, 2021      Sonja Perez  [address]   [facility] [address]        Dear ,    We are writing to inform you of your test results.    Your test results confirm hepatitis C. See the specialist as planned.     Your urine and cholesterol/lipids are good.       Your kidney function is still a little reduced but stable. Avoid NSAIDs such as Advil, ibuprofen, and Aleve. Tylenol (acetaminophen) is okay if something is needed for occasional pain. Your electrolytes including glucose are good.       Your complete blood counts are good.       Resulted Orders   Hepatitis C Screen Reflex to HCV RNA Quant and Genotype   Result Value Ref Range    Hepatitis C Antibody Reactive (A) NR^Nonreactive      Comment:      A reactive result indicates one of the following 1) current HCV infection 2)   past HCV infection that has resolved or 3) false positivity. The CDC   recommends that a reactive result should be followed by Nucleic acid testing   for HCV RNA.   If HCV RNA is detected, that indicates current HCV infection. If HCV RNA is   not detected, that indicates either past, resolved HCV infection, or false HCV   antibody positivity.  Assay performance characteristics have not been established for newborns,   infants, and children     Lipid panel reflex to direct LDL Fasting   Result Value Ref Range    Cholesterol 163 <200 mg/dL    Triglycerides 61 <150 mg/dL    HDL Cholesterol 70 >49 mg/dL    LDL Cholesterol Calculated 81 <100 mg/dL      Comment:      Desirable:       <100 mg/dl    Non HDL Cholesterol 93 <130 mg/dL   UA reflex to Microscopic and Culture   Result Value Ref Range    Color Urine Yellow     Appearance Urine Clear     Glucose Urine Negative NEG^Negative mg/dL    Bilirubin Urine Negative NEG^Negative    Ketones Urine Negative NEG^Negative mg/dL    Specific Gravity Urine 1.010 1.003 - 1.035    Blood Urine Negative NEG^Negative    pH Urine 6.0 5.0 - 7.0 pH    Protein Albumin Urine Negative  NEG^Negative mg/dL    Urobilinogen Urine 0.2 0.2 - 1.0 EU/dL    Nitrite Urine Negative NEG^Negative    Leukocyte Esterase Urine Negative NEG^Negative    Source Midstream Urine    Basic metabolic panel   Result Value Ref Range    Sodium 137 133 - 144 mmol/L    Potassium 4.6 3.4 - 5.3 mmol/L    Chloride 107 94 - 109 mmol/L    Carbon Dioxide 27 20 - 32 mmol/L    Anion Gap 3 3 - 14 mmol/L    Glucose 88 70 - 99 mg/dL    Urea Nitrogen 18 7 - 30 mg/dL    Creatinine 0.99 0.52 - 1.04 mg/dL    GFR Estimate 55 (L) >60 mL/min/[1.73_m2]      Comment:      Non  GFR Calc  Starting 12/18/2018, serum creatinine based estimated GFR (eGFR) will be   calculated using the Chronic Kidney Disease Epidemiology Collaboration   (CKD-EPI) equation.      GFR Estimate If Black 64 >60 mL/min/[1.73_m2]      Comment:       GFR Calc  Starting 12/18/2018, serum creatinine based estimated GFR (eGFR) will be   calculated using the Chronic Kidney Disease Epidemiology Collaboration   (CKD-EPI) equation.      Calcium 9.1 8.5 - 10.1 mg/dL   CBC with platelets differential   Result Value Ref Range    WBC 4.7 4.0 - 11.0 10e9/L    RBC Count 3.97 3.8 - 5.2 10e12/L    Hemoglobin 12.0 11.7 - 15.7 g/dL    Hematocrit 37.4 35.0 - 47.0 %    MCV 94 78 - 100 fl    MCH 30.2 26.5 - 33.0 pg    MCHC 32.1 31.5 - 36.5 g/dL    RDW 13.0 10.0 - 15.0 %    Platelet Count 178 150 - 450 10e9/L    % Neutrophils 59.4 %    % Lymphocytes 27.4 %    % Monocytes 9.6 %    % Eosinophils 3.2 %    % Basophils 0.4 %    Absolute Neutrophil 2.8 1.6 - 8.3 10e9/L    Absolute Lymphocytes 1.3 0.8 - 5.3 10e9/L    Absolute Monocytes 0.5 0.0 - 1.3 10e9/L    Absolute Eosinophils 0.2 0.0 - 0.7 10e9/L    Absolute Basophils 0.0 0.0 - 0.2 10e9/L    Diff Method Automated Method    Hepatitis C RNA Quantitative   Result Value Ref Range    HCV RNA Quant IU/ml 253,967 (A) HCVND^HCV RNA Not Detected [IU]/mL      Comment:      The ADARSH AmpliPrep/ADARSH TaqMan HCV Test is an  FDA-approved in vitro nucleic   acid amplification test for the quantitation of HCV RNA in human plasma (ETDA   plasma) or serum using the ADARSH AmpliPrep Instrument for automated viral   nucleic acid extraction and the ADARSH TaqMan Analyzer or ADARSH TaqMan for   automated Real Time PCR amplification and detection of the viral nucleic acid   target.  Titer results are reported in International Units/mL (IU/mL) using the 1st WHO   International standard for HCV for Nucleic Acid Amplification based assays.      Log of HCV RNA Qt 5.4 (H) <1.2 Log IU/mL   Hepatitis C High Resolution Genotype   Result Value Ref Range    Hepatitis C High Resolution Canceled, Test credited       Comment:      Lost to Testing, Specimen Mishandled       If you have any questions or concerns, please call the clinic at the number listed above.       Sincerely,      NICOLLE Lei CNP/beckwith

## 2021-02-23 ENCOUNTER — APPOINTMENT (OUTPATIENT)
Dept: INTERPRETER SERVICES | Facility: CLINIC | Age: 76
End: 2021-02-23
Payer: COMMERCIAL

## 2021-02-23 LAB — HCV AB SERPL QL IA: REACTIVE

## 2021-02-24 ENCOUNTER — TELEPHONE (OUTPATIENT)
Dept: FAMILY MEDICINE | Facility: CLINIC | Age: 76
End: 2021-02-24

## 2021-02-24 DIAGNOSIS — B19.20 HEPATITIS C VIRUS INFECTION, UNSPECIFIED CHRONICITY: Primary | ICD-10-CM

## 2021-02-24 LAB
HCV RNA SERPL NAA+PROBE-ACNC: ABNORMAL [IU]/ML
HCV RNA SERPL NAA+PROBE-LOG IU: 5.4 LOG IU/ML

## 2021-02-24 NOTE — TELEPHONE ENCOUNTER
Attempted to call patient with  line. Mailbox is full and were not able to leave message. Will route to RNs to try again as I am out until next week.    Patient has hepatitis C infection and needs treatment. This may be contributing to her symptoms. I have put a referral in to GI. If she does not hear from them in the next business day, she should call the number below to schedule.    Gastroenterology Adult Ref Consult Only    [facility]   [address] [address] [place] [id number]   Phone: [phone]        Sri Briggs, APRN, CNP

## 2021-02-25 ENCOUNTER — APPOINTMENT (OUTPATIENT)
Dept: INTERPRETER SERVICES | Facility: CLINIC | Age: 76
End: 2021-02-25
Payer: COMMERCIAL

## 2021-02-25 NOTE — TELEPHONE ENCOUNTER
Called with [facility] . Number listed is for daughterNiyah. No consent to communicate is on file. Advised that we would need to speak with pt to get a verbal ok to speak with daughter, or daughter will need to call back when she is with pt.   Daughter was given number [phone] to call back.   Daughter was upset with writer because I would not give her information and she said she already knows that pt has hepatitis C. Advised again that we do not have consent and would need this to give her pt's medical information. MD form completed and faxed to [facility]. Await call back from pt and/or daughter.      Joaquina Sellers RN  [facility]

## 2021-02-26 NOTE — PROGRESS NOTES
Assessment & Plan     Chronic hepatitis C without hepatic coma (H)  Counseled on hepatis C. See AVS. Patient also signed form permission to communicate with her daughter Niyah Ortega.  - GASTROENTEROLOGY ADULT REF CONSULT ONLY; Future  Return in about 3 months (around 6/1/2021), or if symptoms worsen or fail to improve.    Sri Briggs, NICOLLE CNP  [facility] MARLEY Casillas is a 75 year old who presents for the following health issues  accompanied by her daughter acting as  per patient request:    HPI       Chief Complaint   Patient presents with     Asthma/Allergy Fup     Patient here to review results of recent labs. Patient has Hepatitis C. She continues to have intermittent dizziness and edema.     Review of Systems   Constitutional, HEENT, cardiovascular, pulmonary, gi and gu systems are negative, except as otherwise noted.      Objective    /82 (BP Location: Right arm, Patient Position: Chair, Cuff Size: Adult Large)   Pulse 74   Temp 98  F (36.7  C) (Oral)   Wt 101.2 kg (223 lb)   SpO2 100%   BMI 39.41 kg/m    Body mass index is 39.41 kg/m .  Physical Exam   GENERAL: healthy, alert and no distress  EYES: Eyes grossly normal to inspection, PERRL and conjunctivae and sclerae normal  SKIN: no suspicious lesions or rashes  NEURO: Normal strength and tone, mentation intact and speech normal  PSYCH: mentation appears normal, affect normal/bright    Office Visit on 02/22/2021   Component Date Value Ref Range Status     Hepatitis C Antibody 02/22/2021 Reactive* NR^Nonreactive Final    Comment: A reactive result indicates one of the following 1) current HCV infection 2)   past HCV infection that has resolved or 3) false positivity. The CDC   recommends that a reactive result should be followed by Nucleic acid testing   for HCV RNA.   If HCV RNA is detected, that indicates current HCV infection. If HCV RNA is   not detected, that indicates either past, resolved  HCV infection, or false HCV   antibody positivity.  Assay performance characteristics have not been established for newborns,   infants, and children       Cholesterol 02/22/2021 163  <200 mg/dL Final     Triglycerides 02/22/2021 61  <150 mg/dL Final     HDL Cholesterol 02/22/2021 70  >49 mg/dL Final     LDL Cholesterol Calculated 02/22/2021 81  <100 mg/dL Final    Desirable:       <100 mg/dl     Non HDL Cholesterol 02/22/2021 93  <130 mg/dL Final     Color Urine 02/22/2021 Yellow   Final     Appearance Urine 02/22/2021 Clear   Final     Glucose Urine 02/22/2021 Negative  NEG^Negative mg/dL Final     Bilirubin Urine 02/22/2021 Negative  NEG^Negative Final     Ketones Urine 02/22/2021 Negative  NEG^Negative mg/dL Final     Specific Gravity Urine 02/22/2021 1.010  1.003 - 1.035 Final     Blood Urine 02/22/2021 Negative  NEG^Negative Final     pH Urine 02/22/2021 6.0  5.0 - 7.0 pH Final     Protein Albumin Urine 02/22/2021 Negative  NEG^Negative mg/dL Final     Urobilinogen Urine 02/22/2021 0.2  0.2 - 1.0 EU/dL Final     Nitrite Urine 02/22/2021 Negative  NEG^Negative Final     Leukocyte Esterase Urine 02/22/2021 Negative  NEG^Negative Final     Source 02/22/2021 Midstream Urine   Final     Sodium 02/22/2021 137  133 - 144 mmol/L Final     Potassium 02/22/2021 4.6  3.4 - 5.3 mmol/L Final     Chloride 02/22/2021 107  94 - 109 mmol/L Final     Carbon Dioxide 02/22/2021 27  20 - 32 mmol/L Final     Anion Gap 02/22/2021 3  3 - 14 mmol/L Final     Glucose 02/22/2021 88  70 - 99 mg/dL Final     Urea Nitrogen 02/22/2021 18  7 - 30 mg/dL Final     Creatinine 02/22/2021 0.99  0.52 - 1.04 mg/dL Final     GFR Estimate 02/22/2021 55* >60 mL/min/[1.73_m2] Final    Comment: Non  GFR Calc  Starting 12/18/2018, serum creatinine based estimated GFR (eGFR) will be   calculated using the Chronic Kidney Disease Epidemiology Collaboration   (CKD-EPI) equation.       GFR Estimate If Black 02/22/2021 64  >60 mL/min/[1.73_m2]  Final    Comment:  GFR Calc  Starting 12/18/2018, serum creatinine based estimated GFR (eGFR) will be   calculated using the Chronic Kidney Disease Epidemiology Collaboration   (CKD-EPI) equation.       Calcium 02/22/2021 9.1  8.5 - 10.1 mg/dL Final     WBC 02/22/2021 4.7  4.0 - 11.0 10e9/L Final     RBC Count 02/22/2021 3.97  3.8 - 5.2 10e12/L Final     Hemoglobin 02/22/2021 12.0  11.7 - 15.7 g/dL Final     Hematocrit 02/22/2021 37.4  35.0 - 47.0 % Final     MCV 02/22/2021 94  78 - 100 fl Final     MCH 02/22/2021 30.2  26.5 - 33.0 pg Final     MCHC 02/22/2021 32.1  31.5 - 36.5 g/dL Final     RDW 02/22/2021 13.0  10.0 - 15.0 % Final     Platelet Count 02/22/2021 178  150 - 450 10e9/L Final     % Neutrophils 02/22/2021 59.4  % Final     % Lymphocytes 02/22/2021 27.4  % Final     % Monocytes 02/22/2021 9.6  % Final     % Eosinophils 02/22/2021 3.2  % Final     % Basophils 02/22/2021 0.4  % Final     Absolute Neutrophil 02/22/2021 2.8  1.6 - 8.3 10e9/L Final     Absolute Lymphocytes 02/22/2021 1.3  0.8 - 5.3 10e9/L Final     Absolute Monocytes 02/22/2021 0.5  0.0 - 1.3 10e9/L Final     Absolute Eosinophils 02/22/2021 0.2  0.0 - 0.7 10e9/L Final     Absolute Basophils 02/22/2021 0.0  0.0 - 0.2 10e9/L Final     Diff Method 02/22/2021 Automated Method   Final     HCV RNA Quant IU/ml 02/22/2021 253,967* HCVND^HCV RNA Not Detected [IU]/mL Final    Comment: The ADARSH AmpliPrep/ADARSH TaqMan HCV Test is an FDA-approved in vitro nucleic   acid amplification test for the quantitation of HCV RNA in human plasma (ETDA   plasma) or serum using the ADARSH AmpliPrep Instrument for automated viral   nucleic acid extraction and the ADARSH TaqMan Analyzer or ADARSH TaqMan for   automated Real Time PCR amplification and detection of the viral nucleic acid   target.  Titer results are reported in International Units/mL (IU/mL) using the 1st WHO   International standard for HCV for Nucleic Acid Amplification based assays.        Log of HCV RNA Qt 02/22/2021 5.4* <1.2 Log IU/mL Final

## 2021-03-01 ENCOUNTER — OFFICE VISIT (OUTPATIENT)
Dept: FAMILY MEDICINE | Facility: CLINIC | Age: 76
End: 2021-03-01
Payer: COMMERCIAL

## 2021-03-01 VITALS
TEMPERATURE: 98 F | BODY MASS INDEX: 39.41 KG/M2 | SYSTOLIC BLOOD PRESSURE: 132 MMHG | WEIGHT: 223 LBS | OXYGEN SATURATION: 100 % | DIASTOLIC BLOOD PRESSURE: 82 MMHG | HEART RATE: 74 BPM

## 2021-03-01 DIAGNOSIS — B18.2 CHRONIC HEPATITIS C WITHOUT HEPATIC COMA (H): Primary | ICD-10-CM

## 2021-03-01 PROCEDURE — 99213 OFFICE O/P EST LOW 20 MIN: CPT | Performed by: NURSE PRACTITIONER

## 2021-03-01 RX ORDER — CETIRIZINE HYDROCHLORIDE 10 MG/1
10 TABLET ORAL DAILY
COMMUNITY

## 2021-03-01 RX ORDER — PANTOPRAZOLE SODIUM 40 MG/1
TABLET, DELAYED RELEASE ORAL
COMMUNITY
Start: 2021-01-06

## 2021-03-01 NOTE — PATIENT INSTRUCTIONS
Patient Education     Understanding Hepatitis C Virus (HCV)  Hepatitis means inflammation of the liver. There are many kinds of hepatitis. Some are from infections and can be spread. Others are not. Hepatitis C virus (HCV) can be spread to other people. It can lead to lifelong liver disease. This includes chronic hepatitis, cirrhosis, liver failure, and liver cancer.   Symptoms of hepatitis C  Most people have no symptoms until they develop liver disease years later. Symptoms can include:     Flulike symptoms such as fatigue, nausea, vomiting, diarrhea, and sore muscles and joints    Sore feeling in the upper right abdomen    Yellow color in skin and eyes (jaundice)    Swelling in the belly    Itching    Confusion    Abnormal bleeding    Dark yellow to brown urine    Light-colored stool (gray or rayo color)  How HCV spreads  HCV spreads through contact with an infected person s blood and body fluids. This is most likely to happen if:     You used an infected needle with IV drug use, tattoos, acupuncture, or body piercing    You had a needle stick injury in the hospital    You shared personal care items such as razors    You had sex without a condom with an infected person (a less common cause)    You had a blood transfusion several years ago (blood is now screened for HCV)    You shared drug tools (like snorting straws)    You have ever been in correction  Many people do not know how they were exposed to HCV. The CDC advises people born between 1945 and 1965 to have 1 screening test. Screening is also advised for people born to mothers with HCV.   Prevent the spread  No vaccine can prevent the spread of HCV and hepatitis C. If you have HCV, it s up to you to protect other people from the virus.   Do's:    Cover all of your skin breaks and sores. If you need help, the person helping you should wear latex gloves.    Use condoms during sex.  Don ts:    Don t donate blood, plasma, body organs, other body tissue, or  sperm.    Don t share needles.    Don t share razors, toothbrushes, manicure tools, or other personal items.  [employer] last reviewed this educational content on 2/1/2020 2000-2020 [employer], [employer]. [address] [place] [address], [place] [address]. All rights reserved. This information is not intended as a substitute for professional medical care. Always follow your healthcare professional's instructions.           Patient Education     Hepatitis C Virus    You have been diagnosed with hepatitis C, also called HCV. Hepatitis is an inflammation of the liver. In your case, it's from an infection with the hepatitis C virus.   Causes  The most common causes of hepatitis are viruses. Alcohol and drug abuse, chemical toxins, and immune system problems can also cause hepatitis. So can diabetes, obesity, and the metabolic syndrome.   When a virus causes hepatitis, it's called viral hepatitis. The hepatitis viruses A, B, and C commonly cause viral hepatitis. Other viral infections can also cause hepatitis, such as the viruses that cause mononucleosis and chickenpox.   What all the liver (hepatic) viruses have in common is that once they are spread to you, they infect the liver and then cause inflammation (hepatitis). The different viruses are spread in different ways. But all of them can affect your health over a long time. Possible complications include cirrhosis, liver cancer, and liver failure.   HCV is commonly spread through injection of contaminated body fluids or blood products, transfusions, or IV (intravenous) drug use. It can also be spread through tattoos or piercing with nonsterile tools, or certain medical procedures. The risk of getting hepatitis C from someone you are close to and from sexual contact is very low.   There is currently no vaccine for HCV. Staying away from the common causes is the best way to avoid infection.   Symptoms  Many people with hepatitis B and C have no symptoms or only mild ones  when they are first infected. Often this is also the case for many years later. But HCV can damage your liver. And it can become long-term (chronic) in some people. Symptoms in the early stages can include:     Tiredness, fatigue, or weakness    Low-grade fever    Loss of appetite    Upset stomach, diarrhea, nausea, or vomiting    Belly (abdominal) pain    Dark yellow-colored urine    Light-colored or pale stool (gray or rayo color)    Yellow color of the skin or eyes (jaundice)    Joint pain  These symptoms can be caused by many different conditions. Because these symptoms are not specific to HCV, many people are not diagnosed for a long time. HCV can become chronic in more than 50% of people who are infected. Chronic HCV infection means that you carry the virus and can spread the disease to others. Most people with chronic infection (about 70%) will develop some degree of chronic liver disease. For many, this may cause no symptoms or long-term effects. But over time there is a 20% chance of having healthy liver tissue replaced by scar tissue (cirrhosis). Heavy alcohol drinkers and people with chronic hepatitis B infection are at greatest risk for long-term problems. So are people whose livers are also inflamed from fatty liver disease (a condition called non-alcoholic steatohepatitis or CHAMBERLAIN).   Anyone who has hepatitis C needs to be checked by their healthcare provider at least once a year. This is to be sure the liver inflammation is not getting worse. There is no vaccine yet for hepatitis C. But there is an effective treatment. You can discuss this treatment with your healthcare provider. Most people can be treated successfully with a medicine taken by mouth. Hepatitis C is now considered curable.   Home care    A diet low in saturated fats and high in fruits and vegetables is best for you and your liver. Have small, frequent meals if you experience nausea.    If you are having symptoms of hepatitis, you may  fatigue easily. Get lots of rest. Don't exert yourself too much.    Acetaminophen and nonsteroidal anti-inflammatory drugs (NSAIDs) such as ibuprofen and naproxen can be toxic to the liver in high doses with prolonged use, or if there is existing liver damage.  ? If you have hepatitis, don't take these medicines until you talk about them with your healthcare provider.  ? If you have only mild or no liver damage from chronic hepatitis, you may take acetaminophen in low doses (2 grams per 24 hours). Don't take anti-inflammatory medicines. Never take acetaminophen with alcohol, since this increases the risk of liver damage.    Alcohol stresses the liver. People with hepatitis shouldn't drink alcohol. It can make the disease worse.    Preventing the spread of hepatitis    HCV is most often spread by blood contact. Never share needles, syringes, tattoo equipment, or snorting straws.    Don't try to donate blood, organs, tissues, or semen.    Don't share razors or toothbrushes, although it's very rare to transmit hepatitis C this way.    If you need medical or dental care, tell the staff that you have hepatitis.    If you're pregnant or plan to be pregnant, tell your healthcare provider. There is a small chance that hepatitis C can be transmitted to the unborn baby. HCV isn't passed in breastmilk.    The risk of spreading the virus through sex is low, especially if you only have sex with one partner. Standard safer-sex practices, including using latex condoms, are advised if you have sex with more than one partner. There is no need to change your sexual practices if you are in a long-term relationship with one partner.    The risk of household spread is low. There is no need to avoid close contact or not share meals or utensils.    HCV does not involve any restrictions on work.    Follow-up care  Follow up with your healthcare provider, or as advised. Ask about hepatitis A and B vaccines. You are at greater risk of  getting these types of the disease, and they could cause more damage to your liver. Your sexual partner should contact their healthcare provider and have a test to see if they have been infected with HCV.   If X-rays, a CT scan, an MRI, or an ultrasound was done, they will be reviewed by a specialist. You will be given the results, especially if they affect your treatment.   Call 911  Call 911 if any of these occur:     Trouble breathing or swallowing, wheezing    Confusion    Extreme drowsiness or trouble waking up    Fainting or loss of consciousness    Fast heart rate    Vomiting blood or significant rectal bleeding (red blood or black, tarry stool)  When to get medical advice  Call your healthcare provider right away if any of these occur:    Frequent vomiting    Weight loss from poor appetite    Increase in belly pain or swelling    Increasing drowsiness or confusion    Weakness or dizziness    New or increasing yellow color of skin or eyes (jaundice)    Bleeding from the gums or nose, or easy bruising  [employer] last reviewed this educational content on 4/1/2020 2000-2020 [employer], [employer]. [address] [place] [address], [place] [address]. All rights reserved. This information is not intended as a substitute for professional medical care. Always follow your healthcare professional's instructions.

## 2021-03-03 ENCOUNTER — PRE VISIT (OUTPATIENT)
Dept: GASTROENTEROLOGY | Facility: CLINIC | Age: 76
End: 2021-03-03

## 2021-03-03 NOTE — TELEPHONE ENCOUNTER
RECORDS RECEIVED FROM: Internal   Appt Date: 03.10.2021    NOTES STATUS DETAILS   OFFICE NOTE from referring provider Internal 03.01.2021 Consult Sri Briggs APRN CNP   OFFICE NOTES from other specialists N/A    DISCHARGE SUMMARY from hospital N/A    MEDICATION LIST Internal / CE    LIVER BIOSPY (IF APPLICABLE)      PATHOLOGY REPORTS  N/A    IMAGING     ENDOSCOPY (IF AVAILABLE) N/A    COLONOSCOPY (IF AVAILABLE) N/A    ULTRASOUND LIVER N/A    CT OF ABDOMEN N/A    MRI OF LIVER N/A    FIBROSCAN, US ELASTOGRAPHY, FIBROSIS SCAN, MR ELASTOGRAPHY N/A    LABS     HEPATIC PANEL (LIVER PANEL) N/A    BASIC METABOLIC PANEL Internal 02.22.2021   COMPLETE METABOLIC PANEL Internal 02.22.2021   COMPLETE BLOOD COUNT (CBC) Internal 02.22.2021   INTERNATIONAL NORMALIZED RATIO (INR) N/A    HEPATITIS C ANTIBODY N/A    HEPATITIS C VIRAL LOAD/PCR N/A    HEPATITIS C GENOTYPE N/A    HEPATITIS B SURFACE ANTIGEN N/A    HEPATITIS B SURFACE ANTIBODY N/A    HEPATITIS B DNA QUANT LEVEL N/A    HEPATITIS B CORE ANTIBODY N/A

## 2021-03-10 ENCOUNTER — VIRTUAL VISIT (OUTPATIENT)
Dept: GASTROENTEROLOGY | Facility: CLINIC | Age: 76
End: 2021-03-10
Attending: INTERNAL MEDICINE
Payer: COMMERCIAL

## 2021-03-10 DIAGNOSIS — Z11.59 ENCOUNTER FOR SCREENING FOR OTHER VIRAL DISEASES: ICD-10-CM

## 2021-03-10 DIAGNOSIS — B18.2 CHRONIC HEPATITIS C WITHOUT HEPATIC COMA (H): Primary | ICD-10-CM

## 2021-03-10 PROCEDURE — 99443 PR PHYSICIAN TELEPHONE EVALUATION 21-30 MIN: CPT | Performed by: INTERNAL MEDICINE

## 2021-03-10 ASSESSMENT — PAIN SCALES - GENERAL: PAINLEVEL: NO PAIN (0)

## 2021-03-10 NOTE — PROGRESS NOTES
Sonja is a 75 year old who is being evaluated via a billable telephone visit.      What phone number would you like to be contacted at? [phone]  How would you like to obtain your AVS? Mail a copy  Phone call duration: 30 minutes    [facility]    Hepatology New Patient Visit    Referring provider:  Sri Briggs    CHIEF COMPLAINT AND REASON FOR THE VISIT:  New diagnosis of hepatitis C.      HISTORY OF PRESENT ILLNESS:  Mrs. Perez is a 75-year-old [place] female who claims to have a number of years younger than above stated age.  She has asthma and is on inhalers.  She otherwise appeared to be healthy and was diagnosed with hepatitis C just recently.  She is not sure how she acquired it, if she had it before she moved here to the [place].  Anyway, she denies any jaundice, does not have any signs of acute viral hepatitis in the past.  She does not have also any signs of chronic liver disease like abdominal distention, lower extremity edema, lethargy or confusion, does not have memory issues, never had gastrointestinal bleeding like melena, hematemesis or hematochezia.  She also denies any other symptoms like abdominal pain, nausea or vomiting.  She is moving her bowels regularly once a day with no blood in it.  She also tells me that her weight has remained stable.  She did not have in the past COVID nor was she tested.     Medical hx Surgical hx   Past Medical History:   Diagnosis Date     History of Helicobacter pylori infection      Recurrent sinusitis 12/5/2018     Seasonal allergic rhinitis due to other allergic trigger 12/5/2018     Uncomplicated asthma       No past surgical history on file.       Medications  Prior to Admission medications    Medication Sig Start Date End Date Taking? Authorizing Provider   Acetaminophen (TYLENOL PO)    Yes Reported, Patient   cetirizine (ZYRTEC) 10 MG tablet Take 10 mg by mouth daily   Yes Reported, Patient   FLOVENT   MCG/ACT inhaler TAKE 2 PUFFS BY MOUTH TWICE A DAY 11/6/18  Yes Reported, Patient   montelukast (SINGULAIR) 10 MG tablet Take 1 tablet (10 mg) by mouth At Bedtime 12/5/18  Yes Stef Robbins MD   Multiple Vitamins-Minerals (VITAMIN D3 COMPLETE PO) TAKE 1 TABLET BY MOUTH EVERY DAY 3/28/20  Yes Reported, Patient   pantoprazole (PROTONIX) 40 MG EC tablet TAKE 1 TABLET (40 MG) BY MOUTH DAILY 30 MINUTES BEFORE BREAKFAST 1/6/21  Yes Reported, Patient   QVAR REDIHALER 80 MCG/ACT inhaler INHALE 1 PUFF BY MOUTH TWICE A DAY INTO EACH NOSTRIL 10/22/18  Yes Reported, Patient   REFRESH OPTIVE ADVANCED 0.5-1-0.5 % SOLN 1 DROP IN BOTH EYES 3 TIMES DAILY 10/11/18  Yes Reported, Patient   VENTOLIN  (90 Base) MCG/ACT inhaler Inhale 2 puffs into the lungs every 4 hours as needed 12/30/17  Yes Reported, Patient       Allergies  Allergies   Allergen Reactions     Oxybutynin Itching     Other reaction(s): Edema       Family hx Social hx   No family history on file.   Social History     Tobacco Use     Smoking status: Never Smoker     Smokeless tobacco: Never Used   Substance Use Topics     Alcohol use: No     Drug use: No        .  REVIEW OF SYSTEMS:  Ms. Perez denies any recent infection.  She has no fatigue but complains of some headaches in the past.  She has also possible asthma and has some cough and shortness of breath.  Otherwise, no chest pain.  She claims also that she has no significant anemia or easy bruising to her knowledge.  Denies any diabetes or thyroid issues.  She has degenerative joint disease, mostly of her knees and lower extremities.  She denies any psychiatric diagnosis.  As far as eyes are concerned, she had bilateral cataract surgery.  No hearing issues.  No skin problems.  Otherwise, a comprehensive review of symptoms was noncontributory.     Examination  There were no vitals taken for this visit.  There is no height or weight on file to calculate BMI.    Gen- well, NAD, A+Ox3.  Psych- normal  mood    Laboratory  Lab Results   Component Value Date     02/22/2021    POTASSIUM 4.6 02/22/2021    CHLORIDE 107 02/22/2021    CO2 27 02/22/2021    BUN 18 02/22/2021    CR 0.99 02/22/2021       Lab Results   Component Value Date    BILITOTAL 0.7 12/10/2019    ALT 25 12/10/2019    AST 35 12/10/2019    ALKPHOS 121 12/10/2019       Lab Results   Component Value Date    ALBUMIN 2.9 12/10/2019    PROTTOTAL 7.5 12/10/2019        Lab Results   Component Value Date    WBC 4.7 02/22/2021    HGB 12.0 02/22/2021    MCV 94 02/22/2021     02/22/2021       No results found for: INR      Radiology    ASSESSMENT AND PLAN:  Recent diagnosis of hepatitis C.  We will do the full workup, including repeating her HCV RNA.  We will also get markers for hepatitis B, as that is very common in [facility], and at the same time there is a possibility of reactivation if DAA are given.  She will need her liver function tests repeated.  Hers were normal already.  We will get an ultrasound, although from her labs, it appears that she might not have chronic liver disease.  Her platelet count is 178.  After the above workup is done, which includes the labs and also the ultrasound, we will bring her back here, and we will discuss with her about options for treatment.  She can benefit from a band genotypic DAA, and that is what we will be doing.  For her other medical issues and healthcare maintenance, she will follow with her primary care physician.  Follow up in 8 weeks.     Chris Elaine MD  Hepatology  [facility]

## 2021-03-10 NOTE — LETTER
3/10/2021         RE: Sonja Perez  [address]  [facility] [address]        Dear Colleague,    Thank you for referring your patient, Sonja Perez, to the [facility] HEPATOLOGY [facility]IN[facility] [place]. Please see a copy of my visit note below.    Sonja is a 75 year old who is being evaluated via a billable telephone visit.      What phone number would you like to be contacted at? [phone]  How would you like to obtain your AVS? Mail a copy  Phone call duration: 30 minutes    [facility]    Hepatology New Patient Visit    Referring provider:  Sri Briggs    CHIEF COMPLAINT AND REASON FOR THE VISIT:  New diagnosis of hepatitis C.      HISTORY OF PRESENT ILLNESS:  Mrs. Perez is a 75-year-old [place] female who claims to have a number of years younger than above stated age.  She has asthma and is on inhalers.  She otherwise appeared to be healthy and was diagnosed with hepatitis C just recently.  She is not sure how she acquired it, if she had it before she moved here to the [place].  Anyway, she denies any jaundice, does not have any signs of acute viral hepatitis in the past.  She does not have also any signs of chronic liver disease like abdominal distention, lower extremity edema, lethargy or confusion, does not have memory issues, never had gastrointestinal bleeding like melena, hematemesis or hematochezia.  She also denies any other symptoms like abdominal pain, nausea or vomiting.  She is moving her bowels regularly once a day with no blood in it.  She also tells me that her weight has remained stable.  She did not have in the past COVID nor was she tested.     Medical hx Surgical hx   Past Medical History:   Diagnosis Date     History of Helicobacter pylori infection      Recurrent sinusitis 12/5/2018     Seasonal allergic rhinitis due to other allergic trigger 12/5/2018     Uncomplicated asthma       No past surgical history on file.        Medications  Prior to Admission medications    Medication Sig Start Date End Date Taking? Authorizing Provider   Acetaminophen (TYLENOL PO)    Yes Reported, Patient   cetirizine (ZYRTEC) 10 MG tablet Take 10 mg by mouth daily   Yes Reported, Patient   FLOVENT  MCG/ACT inhaler TAKE 2 PUFFS BY MOUTH TWICE A DAY 11/6/18  Yes Reported, Patient   montelukast (SINGULAIR) 10 MG tablet Take 1 tablet (10 mg) by mouth At Bedtime 12/5/18  Yes Stef Robbins MD   Multiple Vitamins-Minerals (VITAMIN D3 COMPLETE PO) TAKE 1 TABLET BY MOUTH EVERY DAY 3/28/20  Yes Reported, Patient   pantoprazole (PROTONIX) 40 MG EC tablet TAKE 1 TABLET (40 MG) BY MOUTH DAILY 30 MINUTES BEFORE BREAKFAST 1/6/21  Yes Reported, Patient   QVAR REDIHALER 80 MCG/ACT inhaler INHALE 1 PUFF BY MOUTH TWICE A DAY INTO EACH NOSTRIL 10/22/18  Yes Reported, Patient   REFRESH OPTIVE ADVANCED 0.5-1-0.5 % SOLN 1 DROP IN BOTH EYES 3 TIMES DAILY 10/11/18  Yes Reported, Patient   VENTOLIN  (90 Base) MCG/ACT inhaler Inhale 2 puffs into the lungs every 4 hours as needed 12/30/17  Yes Reported, Patient       Allergies  Allergies   Allergen Reactions     Oxybutynin Itching     Other reaction(s): Edema       Family hx Social hx   No family history on file.   Social History     Tobacco Use     Smoking status: Never Smoker     Smokeless tobacco: Never Used   Substance Use Topics     Alcohol use: No     Drug use: No        .  REVIEW OF SYSTEMS:  Ms. Perez denies any recent infection.  She has no fatigue but complains of some headaches in the past.  She has also possible asthma and has some cough and shortness of breath.  Otherwise, no chest pain.  She claims also that she has no significant anemia or easy bruising to her knowledge.  Denies any diabetes or thyroid issues.  She has degenerative joint disease, mostly of her knees and lower extremities.  She denies any psychiatric diagnosis.  As far as eyes are concerned, she had bilateral cataract surgery.   No hearing issues.  No skin problems.  Otherwise, a comprehensive review of symptoms was noncontributory.     Examination  There were no vitals taken for this visit.  There is no height or weight on file to calculate BMI.    Gen- well, NAD, A+Ox3.  Psych- normal mood    Laboratory  Lab Results   Component Value Date     02/22/2021    POTASSIUM 4.6 02/22/2021    CHLORIDE 107 02/22/2021    CO2 27 02/22/2021    BUN 18 02/22/2021    CR 0.99 02/22/2021       Lab Results   Component Value Date    BILITOTAL 0.7 12/10/2019    ALT 25 12/10/2019    AST 35 12/10/2019    ALKPHOS 121 12/10/2019       Lab Results   Component Value Date    ALBUMIN 2.9 12/10/2019    PROTTOTAL 7.5 12/10/2019        Lab Results   Component Value Date    WBC 4.7 02/22/2021    HGB 12.0 02/22/2021    MCV 94 02/22/2021     02/22/2021       No results found for: INR      Radiology    ASSESSMENT AND PLAN:  Recent diagnosis of hepatitis C.  We will do the full workup, including repeating her HCV RNA.  We will also get markers for hepatitis B, as that is very common in [facility], and at the same time there is a possibility of reactivation if DAA are given.  She will need her liver function tests repeated.  Hers were normal already.  We will get an ultrasound, although from her labs, it appears that she might not have chronic liver disease.  Her platelet count is 178.  After the above workup is done, which includes the labs and also the ultrasound, we will bring her back here, and we will discuss with her about options for treatment.  She can benefit from a band genotypic DAA, and that is what we will be doing.  For her other medical issues and healthcare maintenance, she will follow with her primary care physician.  Follow up in 8 weeks.     Chris Elaine MD  Hepatology  [facility]

## 2021-03-24 ENCOUNTER — ANCILLARY PROCEDURE (OUTPATIENT)
Dept: ULTRASOUND IMAGING | Facility: CLINIC | Age: 76
End: 2021-03-24
Attending: INTERNAL MEDICINE
Payer: COMMERCIAL

## 2021-03-24 DIAGNOSIS — B18.2 CHRONIC HEPATITIS C WITHOUT HEPATIC COMA (H): ICD-10-CM

## 2021-03-24 DIAGNOSIS — Z11.59 ENCOUNTER FOR SCREENING FOR OTHER VIRAL DISEASES: ICD-10-CM

## 2021-03-24 LAB
ALBUMIN SERPL-MCNC: 2.7 G/DL (ref 3.4–5)
ALP SERPL-CCNC: 102 U/L (ref 40–150)
ALT SERPL W P-5'-P-CCNC: 29 U/L (ref 0–50)
ANION GAP SERPL CALCULATED.3IONS-SCNC: 5 MMOL/L (ref 3–14)
AST SERPL W P-5'-P-CCNC: 26 U/L (ref 0–45)
BILIRUB SERPL-MCNC: 0.6 MG/DL (ref 0.2–1.3)
BUN SERPL-MCNC: 15 MG/DL (ref 7–30)
CALCIUM SERPL-MCNC: 9.1 MG/DL (ref 8.5–10.1)
CHLORIDE SERPL-SCNC: 108 MMOL/L (ref 94–109)
CO2 SERPL-SCNC: 29 MMOL/L (ref 20–32)
CREAT SERPL-MCNC: 0.97 MG/DL (ref 0.52–1.04)
ERYTHROCYTE [DISTWIDTH] IN BLOOD BY AUTOMATED COUNT: 12.9 % (ref 10–15)
GFR SERPL CREATININE-BSD FRML MDRD: 56 ML/MIN/{1.73_M2}
GLUCOSE SERPL-MCNC: 85 MG/DL (ref 70–99)
HCT VFR BLD AUTO: 36.8 % (ref 35–47)
HGB BLD-MCNC: 11.6 G/DL (ref 11.7–15.7)
INR PPP: 1.13 (ref 0.86–1.14)
MCH RBC QN AUTO: 30.1 PG (ref 26.5–33)
MCHC RBC AUTO-ENTMCNC: 31.5 G/DL (ref 31.5–36.5)
MCV RBC AUTO: 96 FL (ref 78–100)
PLATELET # BLD AUTO: 159 10E9/L (ref 150–450)
POTASSIUM SERPL-SCNC: 4.1 MMOL/L (ref 3.4–5.3)
PROT SERPL-MCNC: 7.3 G/DL (ref 6.8–8.8)
RBC # BLD AUTO: 3.85 10E12/L (ref 3.8–5.2)
SODIUM SERPL-SCNC: 143 MMOL/L (ref 133–144)
WBC # BLD AUTO: 5.4 10E9/L (ref 4–11)

## 2021-03-24 PROCEDURE — 86706 HEP B SURFACE ANTIBODY: CPT | Mod: 90 | Performed by: PATHOLOGY

## 2021-03-24 PROCEDURE — 87340 HEPATITIS B SURFACE AG IA: CPT | Mod: 90 | Performed by: PATHOLOGY

## 2021-03-24 PROCEDURE — 86704 HEP B CORE ANTIBODY TOTAL: CPT | Mod: 90 | Performed by: PATHOLOGY

## 2021-03-24 PROCEDURE — 76700 US EXAM ABDOM COMPLETE: CPT | Mod: GC | Performed by: RADIOLOGY

## 2021-03-24 PROCEDURE — 85610 PROTHROMBIN TIME: CPT | Performed by: PATHOLOGY

## 2021-03-24 PROCEDURE — 85027 COMPLETE CBC AUTOMATED: CPT | Performed by: PATHOLOGY

## 2021-03-24 PROCEDURE — 99000 SPECIMEN HANDLING OFFICE-LAB: CPT | Performed by: PATHOLOGY

## 2021-03-24 PROCEDURE — 36415 COLL VENOUS BLD VENIPUNCTURE: CPT | Performed by: PATHOLOGY

## 2021-03-24 PROCEDURE — 80053 COMPREHEN METABOLIC PANEL: CPT | Performed by: PATHOLOGY

## 2021-03-25 LAB
HBV CORE AB SERPL QL IA: REACTIVE
HBV SURFACE AB SERPL IA-ACNC: >1000 M[IU]/ML
HBV SURFACE AG SERPL QL IA: NONREACTIVE

## 2021-03-29 DIAGNOSIS — Z00.01 ENCOUNTER FOR GENERAL ADULT MEDICAL EXAMINATION WITH ABNORMAL FINDINGS: Primary | ICD-10-CM

## 2021-03-29 LAB — HCV GENTYP SERPL NAA+PROBE: NORMAL

## 2021-03-29 NOTE — RESULT ENCOUNTER NOTE
Mail letter:    Your test results confirm hepatitis C. See the specialist as planned.     Your urine and cholesterol/lipids are good.     Your kidney function is still a little reduced but stable. Avoid NSAIDs such as Advil, ibuprofen, and Aleve. Tylenol (acetaminophen) is okay if something is needed for occasional pain. Your electrolytes including glucose are good.      Your complete blood counts are good.      Angela Yeager MD

## 2021-04-01 ENCOUNTER — OFFICE VISIT (OUTPATIENT)
Dept: UROLOGY | Facility: CLINIC | Age: 76
End: 2021-04-01
Payer: COMMERCIAL

## 2021-04-01 VITALS
DIASTOLIC BLOOD PRESSURE: 79 MMHG | OXYGEN SATURATION: 99 % | SYSTOLIC BLOOD PRESSURE: 126 MMHG | HEART RATE: 72 BPM | RESPIRATION RATE: 14 BRPM

## 2021-04-01 DIAGNOSIS — N39.41 URGE INCONTINENCE: Primary | ICD-10-CM

## 2021-04-01 LAB
ALBUMIN UR-MCNC: NEGATIVE MG/DL
APPEARANCE UR: CLEAR
BILIRUB UR QL STRIP: NEGATIVE
COLOR UR AUTO: YELLOW
GLUCOSE UR STRIP-MCNC: NEGATIVE MG/DL
HGB UR QL STRIP: ABNORMAL
KETONES UR STRIP-MCNC: NEGATIVE MG/DL
LEUKOCYTE ESTERASE UR QL STRIP: ABNORMAL
NITRATE UR QL: NEGATIVE
PH UR STRIP: 7 PH (ref 5–7)
RBC #/AREA URNS AUTO: ABNORMAL /HPF
SOURCE: ABNORMAL
SP GR UR STRIP: 1.01 (ref 1–1.03)
UROBILINOGEN UR STRIP-ACNC: 0.2 EU/DL (ref 0.2–1)
WBC #/AREA URNS AUTO: ABNORMAL /HPF

## 2021-04-01 PROCEDURE — 51798 US URINE CAPACITY MEASURE: CPT | Performed by: UROLOGY

## 2021-04-01 PROCEDURE — 99205 OFFICE O/P NEW HI 60 MIN: CPT | Mod: 25 | Performed by: UROLOGY

## 2021-04-01 PROCEDURE — 81001 URINALYSIS AUTO W/SCOPE: CPT | Performed by: UROLOGY

## 2021-04-01 RX ORDER — TOLTERODINE 4 MG/1
4 CAPSULE, EXTENDED RELEASE ORAL DAILY
Qty: 90 CAPSULE | Refills: 3 | Status: SHIPPED | OUTPATIENT
Start: 2021-04-01

## 2021-04-01 NOTE — PROGRESS NOTES
"Sonja Perez is a 75 year old female seen in consultation for urge and urge incontinence. Consult from Sonia Perez.  (All info via dtr who serves as )      Pt reports many yr hx progressive urge and frequency, now voiding about q 2 hrs during the day and noc x 2-3.     Very rare leakage; does not wear a pad during the day or at nite.    Denies dysuria, gross hematuria, prior  evaluation, hx bladder surgery, use of bladder meds, trial with Kegel's.     Hx 8 vag deliveries, no hyster, not on HRT. Denies constipation and fecal incontinence.    Drinks 1 caf coffee, 2 tea and 5 bottles of [place] per day; \"I love water and it gives me energy.\"    Reviewed PMH in detail including chronic hep C, myalgia, vertigo, cataracts, GERD, 'polyuria/urinary frequency,' heart murmer, LBP, asthma, sinusitis        Past Medical History:   Diagnosis Date     Hepatitis C virus infection without hepatic coma, unspecified chronicity      History of Helicobacter pylori infection      Recurrent sinusitis 12/05/2018     Seasonal allergic rhinitis due to other allergic trigger 12/05/2018     Uncomplicated asthma        No past surgical history on file.    Social History     Socioeconomic History     Marital status: Single     Spouse name: Not on file     Number of children: Not on file     Years of education: Not on file     Highest education level: Not on file   Occupational History     Not on file   Social Needs     Financial resource strain: Not on file     Food insecurity     Worry: Not on file     Inability: Not on file     Transportation needs     Medical: Not on file     Non-medical: Not on file   Tobacco Use     Smoking status: Never Smoker     Smokeless tobacco: Never Used   Substance and Sexual Activity     Alcohol use: No     Drug use: No     Sexual activity: Not on file   Lifestyle     Physical activity     Days per week: Not on file     Minutes per session: Not on file     Stress: Not on file   Relationships "     Social connections     Talks on phone: Not on file     Gets together: Not on file     Attends Jew service: Not on file     Active member of club or organization: Not on file     Attends meetings of clubs or organizations: Not on file     Relationship status: Not on file     Intimate partner violence     Fear of current or ex partner: Not on file     Emotionally abused: Not on file     Physically abused: Not on file     Forced sexual activity: Not on file   Other Topics Concern     Not on file   Social History Narrative     Not on file       Current Outpatient Medications   Medication Sig Dispense Refill     Acetaminophen (TYLENOL PO)        cetirizine (ZYRTEC) 10 MG tablet Take 10 mg by mouth daily       FLOVENT  MCG/ACT inhaler TAKE 2 PUFFS BY MOUTH TWICE A DAY  11     montelukast (SINGULAIR) 10 MG tablet Take 1 tablet (10 mg) by mouth At Bedtime 30 tablet 6     Multiple Vitamins-Minerals (VITAMIN D3 COMPLETE PO) TAKE 1 TABLET BY MOUTH EVERY DAY       pantoprazole (PROTONIX) 40 MG EC tablet TAKE 1 TABLET (40 MG) BY MOUTH DAILY 30 MINUTES BEFORE BREAKFAST       QVAR REDIHALER 80 MCG/ACT inhaler INHALE 1 PUFF BY MOUTH TWICE A DAY INTO EACH NOSTRIL  11     REFRESH OPTIVE ADVANCED 0.5-1-0.5 % SOLN 1 DROP IN BOTH EYES 3 TIMES DAILY  8     VENTOLIN  (90 Base) MCG/ACT inhaler Inhale 2 puffs into the lungs every 4 hours as needed  2       Physical Exam:    GENL: NAD. (Pt declines any physical exam today)        Today:  Results for orders placed or performed in visit on 04/01/21   UA reflex to Microscopic     Status: Abnormal   Result Value Ref Range    Color Urine Yellow     Appearance Urine Clear     Glucose Urine Negative NEG^Negative mg/dL    Bilirubin Urine Negative NEG^Negative    Ketones Urine Negative NEG^Negative mg/dL    Specific Gravity Urine 1.010 1.003 - 1.035    Blood Urine Trace (A) NEG^Negative    pH Urine 7.0 5.0 - 7.0 pH    Protein Albumin Urine Negative NEG^Negative mg/dL     Urobilinogen Urine 0.2 0.2 - 1.0 EU/dL    Nitrite Urine Negative NEG^Negative    Leukocyte Esterase Urine Trace (A) NEG^Negative    Source Midstream Urine        PVR: 67 ml        3/24/21 Abd ADOLPH: cholilithiasis without cholecystitis, echogenic and lobulated kidneys consistent with pt's known medical disease    3/24/21 Creat 0.97      IMP:  1. OAB; unable to provide complete exam today      PLAN:  1. Discussed situation with patient in detail.  2. Consider moderation of fluids, trial of Detrol LA4; discussed in detail rationale, mech of action, expected results, potential side effect, etc; pt and dtr express understanding and elect trial; RTC 2 mos to review progress  3. Informed them of other  specialists who are female within our system; they express understanding, would like to return to our office for f/u  4. Set realistic expectations  5. 60 minutes spent with patient, more than 50% in counseling and coordination of care for OAB, which did not include time spent for the procedure.

## 2021-05-12 ENCOUNTER — TELEPHONE (OUTPATIENT)
Dept: GASTROENTEROLOGY | Facility: CLINIC | Age: 76
End: 2021-05-12

## 2021-06-01 ENCOUNTER — TELEPHONE (OUTPATIENT)
Dept: GASTROENTEROLOGY | Facility: CLINIC | Age: 76
End: 2021-06-01

## 2021-06-01 DIAGNOSIS — B18.2 CHRONIC HEPATITIS C WITHOUT HEPATIC COMA (H): Primary | ICD-10-CM

## 2021-06-01 NOTE — TELEPHONE ENCOUNTER
Patient scheduled for in person visit with Dr. Elaine 7/14/21 @9:30 a.mRosa JENSEN LPN  Hepatology Clinic     [facility]    Phone Message    May a detailed message be left on voicemail: yes     Reason for Call: Other: Patient needs assistance to schedule in person visit per Dr. Argentina Zavala do not have a templarte schedule. Please call Niyah at [phone] to assist with scheduling.     Action Taken: Message routed to:  [facility] ([facility]    Travel Screening: Not Applicable

## 2021-06-02 ENCOUNTER — ANCILLARY PROCEDURE (OUTPATIENT)
Dept: BONE DENSITY | Facility: CLINIC | Age: 76
End: 2021-06-02
Attending: NURSE PRACTITIONER
Payer: COMMERCIAL

## 2021-06-02 DIAGNOSIS — Z78.0 ASYMPTOMATIC MENOPAUSAL STATE: ICD-10-CM

## 2021-06-02 PROCEDURE — 77080 DXA BONE DENSITY AXIAL: CPT | Performed by: INTERNAL MEDICINE

## 2021-07-14 ENCOUNTER — OFFICE VISIT (OUTPATIENT)
Dept: GASTROENTEROLOGY | Facility: CLINIC | Age: 76
End: 2021-07-14
Attending: INTERNAL MEDICINE
Payer: COMMERCIAL

## 2021-07-14 VITALS
BODY MASS INDEX: 39.11 KG/M2 | DIASTOLIC BLOOD PRESSURE: 90 MMHG | WEIGHT: 220.7 LBS | HEIGHT: 63 IN | SYSTOLIC BLOOD PRESSURE: 151 MMHG | OXYGEN SATURATION: 98 % | TEMPERATURE: 98.6 F | HEART RATE: 87 BPM

## 2021-07-14 DIAGNOSIS — B18.2 CHRONIC HEPATITIS C WITHOUT HEPATIC COMA (H): Primary | ICD-10-CM

## 2021-07-14 PROCEDURE — G0463 HOSPITAL OUTPT CLINIC VISIT: HCPCS

## 2021-07-14 PROCEDURE — 99203 OFFICE O/P NEW LOW 30 MIN: CPT | Performed by: INTERNAL MEDICINE

## 2021-07-14 ASSESSMENT — PAIN SCALES - GENERAL: PAINLEVEL: NO PAIN (0)

## 2021-07-14 ASSESSMENT — MIFFLIN-ST. JEOR: SCORE: 1456.33

## 2021-07-14 NOTE — LETTER
7/14/2021     RE: Sonja Perez  [address]  [facility] [address]    [facility]    Hepatology follow-up    CONSULTING HEALTHCARE PROVIDER:  NICOLLE Hurtado, LANDY    CHIEF COMPLAINT AND REASON FOR THE VISIT:  Hepatitis C virus infection.    SUBJECTIVE:  Ms. Perez is a 75-year-old female who is much younger than above stated age.  She has asthma and is on inhalers.  Otherwise, she appears to be healthy.  She had the diagnosis just now and she might have acquired early on in life.  She has also her hep B markers done and she has protective antibody titers as a result of infection.  She absolutely is not symptomatic.      Denies any jaundice, no signs of acute or chronic liver disease.  She has no abdominal pain, nausea, vomiting.  She is moving her bowels regularly at least once a day and she is in the obese range.    Medical hx Surgical hx   Past Medical History:   Diagnosis Date     Hepatitis C virus infection without hepatic coma, unspecified chronicity      History of Helicobacter pylori infection      Recurrent sinusitis 12/05/2018     Seasonal allergic rhinitis due to other allergic trigger 12/05/2018     Uncomplicated asthma       No past surgical history on file.       Medications  Prior to Admission medications    Medication Sig Start Date End Date Taking? Authorizing Provider   Acetaminophen (TYLENOL PO)    Yes Reported, Patient   cetirizine (ZYRTEC) 10 MG tablet Take 10 mg by mouth daily   Yes Reported, Patient   FLOVENT  MCG/ACT inhaler TAKE 2 PUFFS BY MOUTH TWICE A DAY 11/6/18  Yes Reported, Patient   montelukast (SINGULAIR) 10 MG tablet Take 1 tablet (10 mg) by mouth At Bedtime 12/5/18  Yes Stef Robbins MD   Multiple Vitamins-Minerals (VITAMIN D3 COMPLETE PO) TAKE 1 TABLET BY MOUTH EVERY DAY 3/28/20  Yes Reported, Patient   pantoprazole (PROTONIX) 40 MG EC tablet TAKE 1 TABLET (40 MG) BY MOUTH DAILY 30 MINUTES BEFORE BREAKFAST 1/6/21   "Yes Reported, Patient   QVAR REDIHALER 80 MCG/ACT inhaler INHALE 1 PUFF BY MOUTH TWICE A DAY INTO EACH NOSTRIL 10/22/18  Yes Reported, Patient   REFRESH OPTIVE ADVANCED 0.5-1-0.5 % SOLN 1 DROP IN BOTH EYES 3 TIMES DAILY 10/11/18  Yes Reported, Patient   tolterodine ER (DETROL LA) 4 MG 24 hr capsule Take 1 capsule (4 mg) by mouth daily 4/1/21  Yes Marciano Stein MD   VENTOLIN  (90 Base) MCG/ACT inhaler Inhale 2 puffs into the lungs every 4 hours as needed 12/30/17  Yes Reported, Patient       Allergies  Allergies   Allergen Reactions     Oxybutynin Itching     Other reaction(s): Edema       Review of systems  A 10-point review of systems was negative    Examination  BP (!) 151/90   Pulse 87   Temp 98.6  F (37  C) (Oral)   Ht 1.602 m (5' 3.07\")   Wt 100.1 kg (220 lb 11.2 oz)   SpO2 98%   BMI 39.01 kg/m    Body mass index is 39.01 kg/m .    Gen- well, NAD, A+Ox3, normal color  Lym- no palpable LAD  CVS- RRR  RS- CTA  Abd- Obese.Not tender.  Extr- hands normal, no ADY  Skin- no rash or jaundice  Neuro- no asterixis  Psych- normal mood    Laboratory  Lab Results   Component Value Date     03/24/2021    POTASSIUM 4.1 03/24/2021    CHLORIDE 108 03/24/2021    CO2 29 03/24/2021    BUN 15 03/24/2021    CR 0.97 03/24/2021       Lab Results   Component Value Date    BILITOTAL 0.6 03/24/2021    ALT 29 03/24/2021    AST 26 03/24/2021    ALKPHOS 102 03/24/2021       Lab Results   Component Value Date    ALBUMIN 2.7 03/24/2021    PROTTOTAL 7.3 03/24/2021        Lab Results   Component Value Date    WBC 5.4 03/24/2021    HGB 11.6 03/24/2021    MCV 96 03/24/2021     03/24/2021       Lab Results   Component Value Date    INR 1.13 03/24/2021       Radiology    ASSESSMENT AND PLAN:  Hep C virus infection.  Ms. Perez has hepatitis C virus infection.  She has a low viral load.  She does not have any signs of chronic liver disease.  She will need to be treated and we will treat her with 8 weeks of Mavyret " and we will do that.  Otherwise, she is doing quite well.  She will follow with her primary care physician and for healthcare maintenance issues.  She has problem with obesity and we did discuss with her that she will need to go down with the weight.  As she cannot exercise, I think her best option is to diet and stay away from carbohydrates.  Please note that her BMI is 39.01.    This was a 25-minute visit of which more than 50% was spent in explaining to the patient and her daughter what our plan of care was.  We answered all her questions.     Chris Elaine MD  Hepatology  [facility]    cc:  Ms. Sonja Perez

## 2021-07-14 NOTE — NURSING NOTE
"Chief Complaint   Patient presents with     RECHECK     hepatitis C, chronic     BP (!) 151/90   Pulse 87   Temp 98.6  F (37  C) (Oral)   Ht 1.602 m (5' 3.07\")   Wt 100.1 kg (220 lb 11.2 oz)   SpO2 98%   BMI 39.01 kg/m       Georgi Perez MA  " 157.48

## 2021-07-14 NOTE — LETTER
7/14/2021     RE: Sonja Perez  [address]  [facility] [address]    Dear Colleague,    Thank you for referring your patient, Sonja Perez, to the [facility]PATOLOGY [facility] [place]. Please see a copy of my visit note below.    [facility]    Hepatology follow-up    CONSULTING HEALTHCARE PROVIDER:  Sri Briggs, APRN, CNP    CHIEF COMPLAINT AND REASON FOR THE VISIT:  Hepatitis C virus infection.    SUBJECTIVE:  Ms. Perez is a 75-year-old female who is much younger than above stated age.  She has asthma and is on inhalers.  Otherwise, she appears to be healthy.  She had the diagnosis just now and she might have acquired early on in life.  She has also her hep B markers done and she has protective antibody titers as a result of infection.  She absolutely is not symptomatic.      Denies any jaundice, no signs of acute or chronic liver disease.  She has no abdominal pain, nausea, vomiting.  She is moving her bowels regularly at least once a day and she is in the obese range.    Medical hx Surgical hx   Past Medical History:   Diagnosis Date     Hepatitis C virus infection without hepatic coma, unspecified chronicity      History of Helicobacter pylori infection      Recurrent sinusitis 12/05/2018     Seasonal allergic rhinitis due to other allergic trigger 12/05/2018     Uncomplicated asthma       No past surgical history on file.       Medications  Prior to Admission medications    Medication Sig Start Date End Date Taking? Authorizing Provider   Acetaminophen (TYLENOL PO)    Yes Reported, Patient   cetirizine (ZYRTEC) 10 MG tablet Take 10 mg by mouth daily   Yes Reported, Patient   FLOVENT  MCG/ACT inhaler TAKE 2 PUFFS BY MOUTH TWICE A DAY 11/6/18  Yes Reported, Patient   montelukast (SINGULAIR) 10 MG tablet Take 1 tablet (10 mg) by mouth At Bedtime 12/5/18  Yes Stef Robbins MD   Multiple Vitamins-Minerals (VITAMIN D3 COMPLETE PO) TAKE  "1 TABLET BY MOUTH EVERY DAY 3/28/20  Yes Reported, Patient   pantoprazole (PROTONIX) 40 MG EC tablet TAKE 1 TABLET (40 MG) BY MOUTH DAILY 30 MINUTES BEFORE BREAKFAST 1/6/21  Yes Reported, Patient   QVAR REDIHALER 80 MCG/ACT inhaler INHALE 1 PUFF BY MOUTH TWICE A DAY INTO EACH NOSTRIL 10/22/18  Yes Reported, Patient   REFRESH OPTIVE ADVANCED 0.5-1-0.5 % SOLN 1 DROP IN BOTH EYES 3 TIMES DAILY 10/11/18  Yes Reported, Patient   tolterodine ER (DETROL LA) 4 MG 24 hr capsule Take 1 capsule (4 mg) by mouth daily 4/1/21  Yes Marciano Stein MD   VENTOLIN  (90 Base) MCG/ACT inhaler Inhale 2 puffs into the lungs every 4 hours as needed 12/30/17  Yes Reported, Patient       Allergies  Allergies   Allergen Reactions     Oxybutynin Itching     Other reaction(s): Edema       Review of systems  A 10-point review of systems was negative    Examination  BP (!) 151/90   Pulse 87   Temp 98.6  F (37  C) (Oral)   Ht 1.602 m (5' 3.07\")   Wt 100.1 kg (220 lb 11.2 oz)   SpO2 98%   BMI 39.01 kg/m    Body mass index is 39.01 kg/m .    Gen- well, NAD, A+Ox3, normal color  Lym- no palpable LAD  CVS- RRR  RS- CTA  Abd- Obese.Not tender.  Extr- hands normal, no ADY  Skin- no rash or jaundice  Neuro- no asterixis  Psych- normal mood    Laboratory  Lab Results   Component Value Date     03/24/2021    POTASSIUM 4.1 03/24/2021    CHLORIDE 108 03/24/2021    CO2 29 03/24/2021    BUN 15 03/24/2021    CR 0.97 03/24/2021       Lab Results   Component Value Date    BILITOTAL 0.6 03/24/2021    ALT 29 03/24/2021    AST 26 03/24/2021    ALKPHOS 102 03/24/2021       Lab Results   Component Value Date    ALBUMIN 2.7 03/24/2021    PROTTOTAL 7.3 03/24/2021        Lab Results   Component Value Date    WBC 5.4 03/24/2021    HGB 11.6 03/24/2021    MCV 96 03/24/2021     03/24/2021       Lab Results   Component Value Date    INR 1.13 03/24/2021       Radiology    ASSESSMENT AND PLAN:  Hep C virus infection.  Ms. Perez has hepatitis C " virus infection.  She has a low viral load.  She does not have any signs of chronic liver disease.  She will need to be treated and we will treat her with 8 weeks of Mavyret and we will do that.  Otherwise, she is doing quite well.  She will follow with her primary care physician and for healthcare maintenance issues.  She has problem with obesity and we did discuss with her that she will need to go down with the weight.  As she cannot exercise, I think her best option is to diet and stay away from carbohydrates.  Please note that her BMI is 39.01.    This was a 25-minute visit of which more than 50% was spent in explaining to the patient and her daughter what our plan of care was.  We answered all her questions.         Chris Elaine MD  Hepatology  [facility]    cc:  Ms. Casillas Chris  [address] [place] [id number]

## 2021-07-14 NOTE — PROGRESS NOTES
[facility]    Hepatology follow-up    CONSULTING HEALTHCARE PROVIDER:  Sri Briggs, APRN, CNP    CHIEF COMPLAINT AND REASON FOR THE VISIT:  Hepatitis C virus infection.    SUBJECTIVE:  Ms. Perez is a 75-year-old female who is much younger than above stated age.  She has asthma and is on inhalers.  Otherwise, she appears to be healthy.  She had the diagnosis just now and she might have acquired early on in life.  She has also her hep B markers done and she has protective antibody titers as a result of infection.  She absolutely is not symptomatic.      Denies any jaundice, no signs of acute or chronic liver disease.  She has no abdominal pain, nausea, vomiting.  She is moving her bowels regularly at least once a day and she is in the obese range.    Medical hx Surgical hx   Past Medical History:   Diagnosis Date     Hepatitis C virus infection without hepatic coma, unspecified chronicity      History of Helicobacter pylori infection      Recurrent sinusitis 12/05/2018     Seasonal allergic rhinitis due to other allergic trigger 12/05/2018     Uncomplicated asthma       No past surgical history on file.       Medications  Prior to Admission medications    Medication Sig Start Date End Date Taking? Authorizing Provider   Acetaminophen (TYLENOL PO)    Yes Reported, Patient   cetirizine (ZYRTEC) 10 MG tablet Take 10 mg by mouth daily   Yes Reported, Patient   FLOVENT  MCG/ACT inhaler TAKE 2 PUFFS BY MOUTH TWICE A DAY 11/6/18  Yes Reported, Patient   montelukast (SINGULAIR) 10 MG tablet Take 1 tablet (10 mg) by mouth At Bedtime 12/5/18  Yes Stef Robbins MD   Multiple Vitamins-Minerals (VITAMIN D3 COMPLETE PO) TAKE 1 TABLET BY MOUTH EVERY DAY 3/28/20  Yes Reported, Patient   pantoprazole (PROTONIX) 40 MG EC tablet TAKE 1 TABLET (40 MG) BY MOUTH DAILY 30 MINUTES BEFORE BREAKFAST 1/6/21  Yes Reported, Patient   QVAR REDIHALER 80 MCG/ACT inhaler INHALE 1 PUFF BY MOUTH TWICE A  "DAY INTO EACH NOSTRIL 10/22/18  Yes Reported, Patient   REFRESH OPTIVE ADVANCED 0.5-1-0.5 % SOLN 1 DROP IN BOTH EYES 3 TIMES DAILY 10/11/18  Yes Reported, Patient   tolterodine ER (DETROL LA) 4 MG 24 hr capsule Take 1 capsule (4 mg) by mouth daily 4/1/21  Yes Marciano Stein MD   VENTOLIN  (90 Base) MCG/ACT inhaler Inhale 2 puffs into the lungs every 4 hours as needed 12/30/17  Yes Reported, Patient       Allergies  Allergies   Allergen Reactions     Oxybutynin Itching     Other reaction(s): Edema       Review of systems  A 10-point review of systems was negative    Examination  BP (!) 151/90   Pulse 87   Temp 98.6  F (37  C) (Oral)   Ht 1.602 m (5' 3.07\")   Wt 100.1 kg (220 lb 11.2 oz)   SpO2 98%   BMI 39.01 kg/m    Body mass index is 39.01 kg/m .    Gen- well, NAD, A+Ox3, normal color  Lym- no palpable LAD  CVS- RRR  RS- CTA  Abd- Obese.Not tender.  Extr- hands normal, no ADY  Skin- no rash or jaundice  Neuro- no asterixis  Psych- normal mood    Laboratory  Lab Results   Component Value Date     03/24/2021    POTASSIUM 4.1 03/24/2021    CHLORIDE 108 03/24/2021    CO2 29 03/24/2021    BUN 15 03/24/2021    CR 0.97 03/24/2021       Lab Results   Component Value Date    BILITOTAL 0.6 03/24/2021    ALT 29 03/24/2021    AST 26 03/24/2021    ALKPHOS 102 03/24/2021       Lab Results   Component Value Date    ALBUMIN 2.7 03/24/2021    PROTTOTAL 7.3 03/24/2021        Lab Results   Component Value Date    WBC 5.4 03/24/2021    HGB 11.6 03/24/2021    MCV 96 03/24/2021     03/24/2021       Lab Results   Component Value Date    INR 1.13 03/24/2021       Radiology    ASSESSMENT AND PLAN:  Hep C virus infection.  Ms. Perez has hepatitis C virus infection.  She has a low viral load.  She does not have any signs of chronic liver disease.  She will need to be treated and we will treat her with 8 weeks of Mavyret and we will do that.  Otherwise, she is doing quite well.  She will follow with her " primary care physician and for healthcare maintenance issues.  She has problem with obesity and we did discuss with her that she will need to go down with the weight.  As she cannot exercise, I think her best option is to diet and stay away from carbohydrates.  Please note that her BMI is 39.01.    This was a 25-minute visit of which more than 50% was spent in explaining to the patient and her daughter what our plan of care was.  We answered all her questions.       cc:  Ms. Sonja Elaine MD  Hepatology  [facility]

## 2021-11-17 ENCOUNTER — OFFICE VISIT (OUTPATIENT)
Dept: GASTROENTEROLOGY | Facility: CLINIC | Age: 76
End: 2021-11-17
Attending: INTERNAL MEDICINE
Payer: COMMERCIAL

## 2021-11-17 ENCOUNTER — LAB (OUTPATIENT)
Dept: LAB | Facility: CLINIC | Age: 76
End: 2021-11-17
Payer: COMMERCIAL

## 2021-11-17 VITALS
TEMPERATURE: 98.2 F | DIASTOLIC BLOOD PRESSURE: 78 MMHG | BODY MASS INDEX: 39.07 KG/M2 | HEART RATE: 68 BPM | WEIGHT: 220.5 LBS | HEIGHT: 63 IN | SYSTOLIC BLOOD PRESSURE: 122 MMHG | OXYGEN SATURATION: 99 %

## 2021-11-17 DIAGNOSIS — B18.2 CHRONIC HEPATITIS C WITHOUT HEPATIC COMA (H): Primary | ICD-10-CM

## 2021-11-17 DIAGNOSIS — B18.2 CHRONIC HEPATITIS C WITHOUT HEPATIC COMA (H): ICD-10-CM

## 2021-11-17 LAB
ALBUMIN SERPL-MCNC: 3.1 G/DL (ref 3.4–5)
ALP SERPL-CCNC: 108 U/L (ref 40–150)
ALT SERPL W P-5'-P-CCNC: 27 U/L (ref 0–50)
ANION GAP SERPL CALCULATED.3IONS-SCNC: 9 MMOL/L (ref 3–14)
AST SERPL W P-5'-P-CCNC: 37 U/L (ref 0–45)
BILIRUB DIRECT SERPL-MCNC: 0.2 MG/DL (ref 0–0.2)
BILIRUB SERPL-MCNC: 0.9 MG/DL (ref 0.2–1.3)
BUN SERPL-MCNC: 19 MG/DL (ref 7–30)
CALCIUM SERPL-MCNC: 8.9 MG/DL (ref 8.5–10.1)
CHLORIDE BLD-SCNC: 112 MMOL/L (ref 94–109)
CO2 SERPL-SCNC: 22 MMOL/L (ref 20–32)
CREAT SERPL-MCNC: 0.96 MG/DL (ref 0.52–1.04)
ERYTHROCYTE [DISTWIDTH] IN BLOOD BY AUTOMATED COUNT: 13.2 % (ref 10–15)
GFR SERPL CREATININE-BSD FRML MDRD: 57 ML/MIN/1.73M2
GLUCOSE BLD-MCNC: 91 MG/DL (ref 70–99)
HCT VFR BLD AUTO: 36.1 % (ref 35–47)
HGB BLD-MCNC: 11.7 G/DL (ref 11.7–15.7)
INR PPP: 1.13 (ref 0.85–1.15)
MCH RBC QN AUTO: 30.2 PG (ref 26.5–33)
MCHC RBC AUTO-ENTMCNC: 32.4 G/DL (ref 31.5–36.5)
MCV RBC AUTO: 93 FL (ref 78–100)
PLATELET # BLD AUTO: 182 10E3/UL (ref 150–450)
POTASSIUM BLD-SCNC: 3.9 MMOL/L (ref 3.4–5.3)
PROT SERPL-MCNC: 8 G/DL (ref 6.8–8.8)
RBC # BLD AUTO: 3.87 10E6/UL (ref 3.8–5.2)
SODIUM SERPL-SCNC: 143 MMOL/L (ref 133–144)
WBC # BLD AUTO: 4.6 10E3/UL (ref 4–11)

## 2021-11-17 PROCEDURE — 80053 COMPREHEN METABOLIC PANEL: CPT | Performed by: PATHOLOGY

## 2021-11-17 PROCEDURE — G0463 HOSPITAL OUTPT CLINIC VISIT: HCPCS

## 2021-11-17 PROCEDURE — 87522 HEPATITIS C REVRS TRNSCRPJ: CPT | Mod: 90 | Performed by: PATHOLOGY

## 2021-11-17 PROCEDURE — 82248 BILIRUBIN DIRECT: CPT | Performed by: PATHOLOGY

## 2021-11-17 PROCEDURE — 99214 OFFICE O/P EST MOD 30 MIN: CPT | Performed by: INTERNAL MEDICINE

## 2021-11-17 PROCEDURE — 99000 SPECIMEN HANDLING OFFICE-LAB: CPT | Performed by: PATHOLOGY

## 2021-11-17 PROCEDURE — 36415 COLL VENOUS BLD VENIPUNCTURE: CPT | Performed by: PATHOLOGY

## 2021-11-17 PROCEDURE — 85027 COMPLETE CBC AUTOMATED: CPT | Performed by: PATHOLOGY

## 2021-11-17 PROCEDURE — 85610 PROTHROMBIN TIME: CPT | Performed by: PATHOLOGY

## 2021-11-17 PROCEDURE — 87902 NFCT AGT GNTYP ALYS HEP C: CPT | Mod: 90 | Performed by: PATHOLOGY

## 2021-11-17 ASSESSMENT — PAIN SCALES - GENERAL: PAINLEVEL: NO PAIN (0)

## 2021-11-17 ASSESSMENT — MIFFLIN-ST. JEOR: SCORE: 1450.42

## 2021-11-17 NOTE — LETTER
11/17/2021     RE: Sonja Perez  [address]  [facility] [address]    [facility]    Hepatology follow-up    Follow-up visit for history of hepatitis C virus infection.    Subjective:  Mrs. Perez is a 76 year old female who is younger than above stated age.  She in summary carries a diagnosis of asthma and had hepatitis C. This latter was not treated yet. She also tested positive for hepatitis B surface antibody with protective titers and hepatitis B core antibody.    She has never been symptomatic for the hepatitis C and does not show any signs or symptoms of chronic liver disease. Patient denies jaundice, lower extremity edema, abdominal distension, lethargy or confusion.Patient denies melena, hematemesis or hematochezia.  Her weight has been stable and she could be considered obese.     For other GI symptoms she denies any abdominal pain, nausea or vomiting.  She has good appetite and has 2 bowel movements a day with no blood in it.  She never had colonoscopy and declines to have 1 now. Patient denies fevers, sweats or chills.  For the Covid vaccine she had.name so far 2 doses of moderna.    Medical hx Surgical hx   Past Medical History:   Diagnosis Date     Hepatitis C virus infection without hepatic coma, unspecified chronicity      History of Helicobacter pylori infection      Recurrent sinusitis 12/05/2018     Seasonal allergic rhinitis due to other allergic trigger 12/05/2018     Uncomplicated asthma       No past surgical history on file.       Medications  Prior to Admission medications    Medication Sig Start Date End Date Taking? Authorizing Provider   Acetaminophen (TYLENOL PO)    Yes Reported, Patient   cetirizine (ZYRTEC) 10 MG tablet Take 10 mg by mouth daily   Yes Reported, Patient   FLOVENT  MCG/ACT inhaler TAKE 2 PUFFS BY MOUTH TWICE A DAY 11/6/18  Yes Reported, Patient   montelukast (SINGULAIR) 10 MG tablet Take 1 tablet (10 mg) by mouth At  "Bedtime 12/5/18  Yes Stef Robbins MD   Multiple Vitamins-Minerals (VITAMIN D3 COMPLETE PO) TAKE 1 TABLET BY MOUTH EVERY DAY 3/28/20  Yes Reported, Patient   pantoprazole (PROTONIX) 40 MG EC tablet TAKE 1 TABLET (40 MG) BY MOUTH DAILY 30 MINUTES BEFORE BREAKFAST 1/6/21  Yes Reported, Patient   QVAR REDIHALER 80 MCG/ACT inhaler INHALE 1 PUFF BY MOUTH TWICE A DAY INTO EACH NOSTRIL 10/22/18  Yes Reported, Patient   REFRESH OPTIVE ADVANCED 0.5-1-0.5 % SOLN 1 DROP IN BOTH EYES 3 TIMES DAILY 10/11/18  Yes Reported, Patient   tolterodine ER (DETROL LA) 4 MG 24 hr capsule Take 1 capsule (4 mg) by mouth daily 4/1/21  Yes Marciano Stein MD   VENTOLIN  (90 Base) MCG/ACT inhaler Inhale 2 puffs into the lungs every 4 hours as needed 12/30/17  Yes Reported, Patient       Allergies  Allergies   Allergen Reactions     Oxybutynin Itching     Other reaction(s): Edema       Review of systems  A 10-point review of systems was negative    Examination  /78   Pulse 68   Temp 98.2  F (36.8  C) (Oral)   Ht 1.602 m (5' 3.07\")   Wt 100 kg (220 lb 8 oz)   SpO2 99%   BMI 38.97 kg/m    Body mass index is 38.97 kg/m .    Gen- well, NAD, A+Ox3, normal color  Lym- no palpable LAD  CVS- RRR  RS- CTA  Abd- Obese. Not tender.  Extr- hands normal, no ADY  Skin- no rash or jaundice  Neuro- no asterixis  Psych- normal mood    Laboratory  Lab Results   Component Value Date     03/24/2021    POTASSIUM 4.1 03/24/2021    CHLORIDE 108 03/24/2021    CO2 29 03/24/2021    BUN 15 03/24/2021    CR 0.97 03/24/2021       Lab Results   Component Value Date    BILITOTAL 0.6 03/24/2021    ALT 29 03/24/2021    AST 26 03/24/2021    ALKPHOS 102 03/24/2021       Lab Results   Component Value Date    ALBUMIN 2.7 03/24/2021    PROTTOTAL 7.3 03/24/2021        Lab Results   Component Value Date    WBC 5.4 03/24/2021    HGB 11.6 03/24/2021    MCV 96 03/24/2021     03/24/2021       Lab Results   Component Value Date    INR 1.13 03/24/2021 "       Radiology    ASSESSMENT AND PLAN:  Hepatitis C virus infection.      Ms. Perez has hepatitis C virus infection with a low viral load.  She is not showing any signs of chronic liver disease including fluid retention.  We sent her to be treated with 8 weeks of Mavyret, but unfortunately she had not done her hepatitis C genotype which in this case was not at all important as Mavyret is pangenotypic but the insurance was asking, so she will do that and repeat her labs also today.    We are going to treat her, not because she has chronic liver disease or cirrhosis, but because of her large extended family and we want her not to have hep C and hence eliminate the possibility of transmission to her children and grandchildren.      We did explain to her what she is going to do.  She might need also, as far as her obesity is concerned, to change her diet.  We did have a long conversation before also.      She had also met with our RN, who helps us with this and she will give her contact numbers and she will communicate with them.  Otherwise, she will be seen here in 4 months.    This was a 30-minute visit, of which more than 50% was spent in explaining to the patient what our plan of care was.  We answered all her questions and that of her daughter.    Chris Elaine MD  Hepatology  [facility]

## 2021-11-17 NOTE — PROGRESS NOTES
[facility]    Hepatology follow-up    Follow-up visit for history of hepatitis C virus infection.    Subjective:  Mrs. Perez is a 76 year old female who is younger than above stated age.  She in summary carries a diagnosis of asthma and had hepatitis C. This latter was not treated yet. She also tested positive for hepatitis B surface antibody with protective titers and hepatitis B core antibody.    She has never been symptomatic for the hepatitis C and does not show any signs or symptoms of chronic liver disease. Patient denies jaundice, lower extremity edema, abdominal distension, lethargy or confusion.Patient denies melena, hematemesis or hematochezia.  Her weight has been stable and she could be considered obese.     For other GI symptoms she denies any abdominal pain, nausea or vomiting.  She has good appetite and has 2 bowel movements a day with no blood in it.  She never had colonoscopy and declines to have 1 now. Patient denies fevers, sweats or chills.  For the Covid vaccine she had.name so far 2 doses of moderna.    Medical hx Surgical hx   Past Medical History:   Diagnosis Date     Hepatitis C virus infection without hepatic coma, unspecified chronicity      History of Helicobacter pylori infection      Recurrent sinusitis 12/05/2018     Seasonal allergic rhinitis due to other allergic trigger 12/05/2018     Uncomplicated asthma       No past surgical history on file.       Medications  Prior to Admission medications    Medication Sig Start Date End Date Taking? Authorizing Provider   Acetaminophen (TYLENOL PO)    Yes Reported, Patient   cetirizine (ZYRTEC) 10 MG tablet Take 10 mg by mouth daily   Yes Reported, Patient   FLOVENT  MCG/ACT inhaler TAKE 2 PUFFS BY MOUTH TWICE A DAY 11/6/18  Yes Reported, Patient   montelukast (SINGULAIR) 10 MG tablet Take 1 tablet (10 mg) by mouth At Bedtime 12/5/18  Yes Stef Robbins MD   Multiple Vitamins-Minerals (VITAMIN D3 COMPLETE  "PO) TAKE 1 TABLET BY MOUTH EVERY DAY 3/28/20  Yes Reported, Patient   pantoprazole (PROTONIX) 40 MG EC tablet TAKE 1 TABLET (40 MG) BY MOUTH DAILY 30 MINUTES BEFORE BREAKFAST 1/6/21  Yes Reported, Patient   QVAR REDIHALER 80 MCG/ACT inhaler INHALE 1 PUFF BY MOUTH TWICE A DAY INTO EACH NOSTRIL 10/22/18  Yes Reported, Patient   REFRESH OPTIVE ADVANCED 0.5-1-0.5 % SOLN 1 DROP IN BOTH EYES 3 TIMES DAILY 10/11/18  Yes Reported, Patient   tolterodine ER (DETROL LA) 4 MG 24 hr capsule Take 1 capsule (4 mg) by mouth daily 4/1/21  Yes Marciano Stein MD   VENTOLIN  (90 Base) MCG/ACT inhaler Inhale 2 puffs into the lungs every 4 hours as needed 12/30/17  Yes Reported, Patient       Allergies  Allergies   Allergen Reactions     Oxybutynin Itching     Other reaction(s): Edema       Review of systems  A 10-point review of systems was negative    Examination  /78   Pulse 68   Temp 98.2  F (36.8  C) (Oral)   Ht 1.602 m (5' 3.07\")   Wt 100 kg (220 lb 8 oz)   SpO2 99%   BMI 38.97 kg/m    Body mass index is 38.97 kg/m .    Gen- well, NAD, A+Ox3, normal color  Lym- no palpable LAD  CVS- RRR  RS- CTA  Abd- Obese. Not tender.  Extr- hands normal, no ADY  Skin- no rash or jaundice  Neuro- no asterixis  Psych- normal mood    Laboratory  Lab Results   Component Value Date     03/24/2021    POTASSIUM 4.1 03/24/2021    CHLORIDE 108 03/24/2021    CO2 29 03/24/2021    BUN 15 03/24/2021    CR 0.97 03/24/2021       Lab Results   Component Value Date    BILITOTAL 0.6 03/24/2021    ALT 29 03/24/2021    AST 26 03/24/2021    ALKPHOS 102 03/24/2021       Lab Results   Component Value Date    ALBUMIN 2.7 03/24/2021    PROTTOTAL 7.3 03/24/2021        Lab Results   Component Value Date    WBC 5.4 03/24/2021    HGB 11.6 03/24/2021    MCV 96 03/24/2021     03/24/2021       Lab Results   Component Value Date    INR 1.13 03/24/2021       Radiology    ASSESSMENT AND PLAN:  Hepatitis C virus infection.      Ms. Perez has " hepatitis C virus infection with a low viral load.  She is not showing any signs of chronic liver disease including fluid retention.  We sent her to be treated with 8 weeks of Mavyret, but unfortunately she had not done her hepatitis C genotype which in this case was not at all important as Mavyret is pangenotypic but the insurance was asking, so she will do that and repeat her labs also today.    We are going to treat her, not because she has chronic liver disease or cirrhosis, but because of her large extended family and we want her not to have hep C and hence eliminate the possibility of transmission to her children and grandchildren.      We did explain to her what she is going to do.  She might need also, as far as her obesity is concerned, to change her diet.  We did have a long conversation before also.      She had also met with our RN, who helps us with this and she will give her contact numbers and she will communicate with them.  Otherwise, she will be seen here in 4 months.    This was a 30-minute visit, of which more than 50% was spent in explaining to the patient what our plan of care was.  We answered all her questions and that of her daughter.    Chris Elaine MD  Hepatology  [facility]

## 2021-11-17 NOTE — NURSING NOTE
"Chief Complaint   Patient presents with     RECHECK     f/u     /78   Pulse 68   Temp 98.2  F (36.8  C) (Oral)   Ht 1.602 m (5' 3.07\")   Wt 100 kg (220 lb 8 oz)   SpO2 99%   BMI 38.97 kg/m       Georgi Perez MA  "

## 2021-11-17 NOTE — LETTER
11/17/2021     RE: Sonja Perez  [address]  [facility] [address]    Dear Colleague,    Thank you for referring your patient, Sonja Perez, to the [facility] [place]. Please see a copy of my visit note below.    [facility]    Hepatology follow-up    Follow-up visit for history of hepatitis C virus infection.    Subjective:  Mrs. Perez is a 76 year old female who is younger than above stated age.  She in summary carries a diagnosis of asthma and had hepatitis C. This latter was not treated yet. She also tested positive for hepatitis B surface antibody with protective titers and hepatitis B core antibody.    She has never been symptomatic for the hepatitis C and does not show any signs or symptoms of chronic liver disease. Patient denies jaundice, lower extremity edema, abdominal distension, lethargy or confusion.Patient denies melena, hematemesis or hematochezia.  Her weight has been stable and she could be considered obese.     For other GI symptoms she denies any abdominal pain, nausea or vomiting.  She has good appetite and has 2 bowel movements a day with no blood in it.  She never had colonoscopy and declines to have 1 now. Patient denies fevers, sweats or chills.  For the Covid vaccine she had.name so far 2 doses of moderna.    Medical hx Surgical hx   Past Medical History:   Diagnosis Date     Hepatitis C virus infection without hepatic coma, unspecified chronicity      History of Helicobacter pylori infection      Recurrent sinusitis 12/05/2018     Seasonal allergic rhinitis due to other allergic trigger 12/05/2018     Uncomplicated asthma       No past surgical history on file.       Medications  Prior to Admission medications    Medication Sig Start Date End Date Taking? Authorizing Provider   Acetaminophen (TYLENOL PO)    Yes Reported, Patient   cetirizine (ZYRTEC) 10 MG tablet Take 10 mg by mouth daily   Yes Reported,  "Patient   FLOVENT  MCG/ACT inhaler TAKE 2 PUFFS BY MOUTH TWICE A DAY 11/6/18  Yes Reported, Patient   montelukast (SINGULAIR) 10 MG tablet Take 1 tablet (10 mg) by mouth At Bedtime 12/5/18  Yes Stef Robbins MD   Multiple Vitamins-Minerals (VITAMIN D3 COMPLETE PO) TAKE 1 TABLET BY MOUTH EVERY DAY 3/28/20  Yes Reported, Patient   pantoprazole (PROTONIX) 40 MG EC tablet TAKE 1 TABLET (40 MG) BY MOUTH DAILY 30 MINUTES BEFORE BREAKFAST 1/6/21  Yes Reported, Patient   QVAR REDIHALER 80 MCG/ACT inhaler INHALE 1 PUFF BY MOUTH TWICE A DAY INTO EACH NOSTRIL 10/22/18  Yes Reported, Patient   REFRESH OPTIVE ADVANCED 0.5-1-0.5 % SOLN 1 DROP IN BOTH EYES 3 TIMES DAILY 10/11/18  Yes Reported, Patient   tolterodine ER (DETROL LA) 4 MG 24 hr capsule Take 1 capsule (4 mg) by mouth daily 4/1/21  Yes Marciano Stein MD   VENTOLIN  (90 Base) MCG/ACT inhaler Inhale 2 puffs into the lungs every 4 hours as needed 12/30/17  Yes Reported, Patient       Allergies  Allergies   Allergen Reactions     Oxybutynin Itching     Other reaction(s): Edema       Review of systems  A 10-point review of systems was negative    Examination  /78   Pulse 68   Temp 98.2  F (36.8  C) (Oral)   Ht 1.602 m (5' 3.07\")   Wt 100 kg (220 lb 8 oz)   SpO2 99%   BMI 38.97 kg/m    Body mass index is 38.97 kg/m .    Gen- well, NAD, A+Ox3, normal color  Lym- no palpable LAD  CVS- RRR  RS- CTA  Abd- Obese. Not tender.  Extr- hands normal, no ADY  Skin- no rash or jaundice  Neuro- no asterixis  Psych- normal mood    Laboratory  Lab Results   Component Value Date     03/24/2021    POTASSIUM 4.1 03/24/2021    CHLORIDE 108 03/24/2021    CO2 29 03/24/2021    BUN 15 03/24/2021    CR 0.97 03/24/2021       Lab Results   Component Value Date    BILITOTAL 0.6 03/24/2021    ALT 29 03/24/2021    AST 26 03/24/2021    ALKPHOS 102 03/24/2021       Lab Results   Component Value Date    ALBUMIN 2.7 03/24/2021    PROTTOTAL 7.3 03/24/2021        Lab " Results   Component Value Date    WBC 5.4 03/24/2021    HGB 11.6 03/24/2021    MCV 96 03/24/2021     03/24/2021       Lab Results   Component Value Date    INR 1.13 03/24/2021       Radiology    ASSESSMENT AND PLAN:  Hepatitis C virus infection.      Ms. Perez has hepatitis C virus infection with a low viral load.  She is not showing any signs of chronic liver disease including fluid retention.  We sent her to be treated with 8 weeks of Mavyret, but unfortunately she had not done her hepatitis C genotype which in this case was not at all important as Mavyret is pangenotypic but the insurance was asking, so she will do that and repeat her labs also today.    We are going to treat her, not because she has chronic liver disease or cirrhosis, but because of her large extended family and we want her not to have hep C and hence eliminate the possibility of transmission to her children and grandchildren.      We did explain to her what she is going to do.  She might need also, as far as her obesity is concerned, to change her diet.  We did have a long conversation before also.      She had also met with our RN, who helps us with this and she will give her contact numbers and she will communicate with them.  Otherwise, she will be seen here in 4 months.    This was a 30-minute visit, of which more than 50% was spent in explaining to the patient what our plan of care was.  We answered all her questions and that of her daughter.    Chris Elaine MD  Hepatology  [facility]

## 2021-11-18 LAB
HCV RNA SERPL NAA+PROBE-ACNC: ABNORMAL IU/ML
HCV RNA SERPL NAA+PROBE-LOG IU: 5.2 {LOG_IU}/ML

## 2021-11-24 LAB — HCV GENTYP SERPL NAA+PROBE: NORMAL

## 2021-12-14 DIAGNOSIS — B18.2 CHRONIC HEPATITIS C WITHOUT HEPATIC COMA (H): Primary | ICD-10-CM

## 2021-12-16 ENCOUNTER — TELEPHONE (OUTPATIENT)
Dept: GASTROENTEROLOGY | Facility: CLINIC | Age: 76
End: 2021-12-16

## 2021-12-20 NOTE — TELEPHONE ENCOUNTER
Prior Authorization Approval    Authorization Effective Date: 11/16/2021  Authorization Expiration Date: 2/10/2022  Medication: Mavyret  Approved Dose/Quantity: 8 weeks  Reference #: [id number]   Insurance Company: [employer] - Phone [phone] Fax [phone]  Expected CoPay: $0     CoPay Card Available: No    Foundation Assistance Needed: No  Which Pharmacy is filling the prescription (Not needed for infusion/clinic administered): [facility] - [facility] [address]  Pharmacy Notified: Yes  Patient Notified: Yes

## 2021-12-28 ENCOUNTER — TELEPHONE (OUTPATIENT)
Dept: GASTROENTEROLOGY | Facility: CLINIC | Age: 76
End: 2021-12-28
Payer: COMMERCIAL

## 2021-12-28 NOTE — TELEPHONE ENCOUNTER
Per pts daughter Niyah, pt has received the Mavyret medication but has not yet started it. She has been feeling ill, with headaches and nausea, and is waiting until she is feeling better to get started. Will check back later this week, and encouraged pt/daughter to call writer if she starts medication. Clarified call back number.

## 2021-12-31 NOTE — TELEPHONE ENCOUNTER
Attempted to reach patient's daughter Niyah. Message left requesting call back regarding Mavyret start date. Provided call back number..

## 2022-01-04 ENCOUNTER — CARE COORDINATION (OUTPATIENT)
Dept: GASTROENTEROLOGY | Facility: CLINIC | Age: 77
End: 2022-01-04
Payer: COMMERCIAL

## 2022-01-04 DIAGNOSIS — B18.2 CHRONIC HEPATITIS C WITHOUT HEPATIC COMA (H): Primary | ICD-10-CM

## 2022-01-04 NOTE — PROGRESS NOTES
Connected with patient for f/u on Hep C treatment delivery/start status. Patient received their Mavyret medication and are ready to start treatment. Patient will be on Hep C treatment for 8 weeks. Patient reports no recent changes in health, hospitalizations or recent changes in medications. Patient did discuss with a pharmacist. Reviewed the following Hep C POC and education with patient.     Hepatitis C Treatment  Treatment: Mavyret x 8 weeks  Genotype: 4  Stage Fibrosis: No signs of chronic liver disease  Previous Treatment Outcome: Naive    Please have labs drawn as close to the date indicated at an [facility].    Start Date: 01/02/22    Week 8-End of Treatment  HCV RNA Quant Lab due: 2/27/2022  Please have this lab drawn on or within a week after this date 2/27/22. You will need to repeat this lab 3 months after completing treatment to ensure you have cleared the virus. Please see date for the final lab below. You do not need to fast for this lab.       3 Months Post Treatment  HCV RNA Quant, hepatic panel Lab due: 5/28/2022  Please have this lab drawn on the specified date of 5/28/22 or within a week after. This final lab will determine if you have cleared the Hepatitis C virus with Mavyret treatment. Without this final lab, we will be unable to determine if treatment was successful. You do not need to fast for this lab.       Educational information to patient on Hep C treatment;     -Contact the [facility] Hepatology clinic and speak with clinical RN prior to starting any new prescribed or OTC medications.   -Take medications exactly as prescribed, do not change dose or stop taking without consulting your provider.   -Take Medication one time each day with or without food  -If you miss a dose of medication, then take it as soon as you remember on the same day. If not remembered on the same day, then skip the dose and take the next dose at the usual time. Do not take more than the recommended dose.  Contact the clinic if you miss a dose.    Please contact the pharmacy 1-2 weeks prior to needing a medication refill.      Side Effects  The most common side effects of Hep C medication treatment can include:  -tiredness  -headache  Notify the clinic of any side effects that bother you or that do not go away.   Possible side effects have been discussed.   Patient has been instructed to clinic for rash, itching or unmanageable nausea.    How to store Hep C Treatment Medications  -Store Medication at room temperature below 86 degrees F  -Keep Medication in it's original container  -Do not use Medication if the seal is broken or missing    General information  It is not known if treatment will prevent you from infecting another person or reinfecting yourself with the hepatitis C virus during treatment. It is best that as soon as you start treatment to buy a new toothbrush, disposable razors (if you use a rotating shaver you do not need to buy a new one) and nail clippers. If you wear dentures, you should soak your dentures in 70-90% Isopropyl solution for 5 minutes one time within the first week of starting treatment. After dentures are done soaking, rinse your dentures off thoroughly with water. If you check your blood sugar at home, please dispose of the fingerstick needle after each use and DO NOT REUSE the insulin needles. These items should not be shared with anyone.        If you have any questions, please contact the main clinic at [phone] or your clinical RN at [phone]. We appreciate you choosing the [facility] clinic for your treatment. Patient agrees to Hep C treatment POC and verbalizes understanding. Patient will receive a copy of treatment plan in the mail, address verified with patient. Patient has no further questions or concerns. Hep C care team updated on patient status.      Vanesa Zapata RN Care Coordinator   [facility]  Hepatology  Clinic/Specialty Program

## 2022-01-04 NOTE — LETTER
January 4, 2022         TO: Sonja Perez  [address]  [place] [address]       Dear ,    Hepatitis C Treatment  Treatment: Mavyret x 8 weeks    Please have labs drawn as close to the date indicated at an [facility].    Start Date: 01/02/22    Week 8-End of Treatment  HCV RNA Quant Lab due: 2/27/2022  Please have this lab drawn on or within a week after this date 2/27/22. You will need to repeat this lab 3 months after completing treatment to ensure you have cleared the virus. Please see date for the final lab below. You do not need to fast for this lab.       3 Months Post Treatment  HCV RNA Quant, hepatic panel Lab due: 5/28/2022  Please have this lab drawn on the specified date of 5/28/22 or within a week after. This final lab will determine if you have cleared the Hepatitis C virus with Mavyret treatment. Without this final lab, we will be unable to determine if treatment was successful. You do not need to fast for this lab.               Educational information to patient on Hep C treatment;     -Contact the [facility] Hepatology clinic and speak with clinical RN prior to starting any new prescribed or OTC medications.   -Take medications exactly as prescribed, do not change dose or stop taking without consulting your provider.   -Take Medication one time each day with or without food  -If you miss a dose of medication, then take it as soon as you remember on the same day. If not remembered on the same day, then skip the dose and take the next dose at the usual time. Do not take more than the recommended dose. Contact the clinic if you miss a dose.    Please contact the pharmacy 1-2 weeks prior to needing a medication refill.      Side Effects  The most common side effects of Hep C medication treatment can include:  -tiredness  -headache  Notify the clinic of any side effects that bother you or that do not go away.   Possible side effects have been discussed.   Patient has  been instructed to clinic for rash, itching or unmanageable nausea.    How to store Hep C Treatment Medications  -Store Medication at room temperature below 86 degrees F  -Keep Medication in it's original container  -Do not use Medication if the seal is broken or missing    General information  It is not known if treatment will prevent you from infecting another person or reinfecting yourself with the hepatitis C virus during treatment. It is best that as soon as you start treatment to buy a new toothbrush, disposable razors (if you use a rotating shaver you do not need to buy a new one) and nail clippers. If you wear dentures, you should soak your dentures in 70-90% Isopropyl solution for 5 minutes one time within the first week of starting treatment. After dentures are done soaking, rinse your dentures off thoroughly with water. If you check your blood sugar at home, please dispose of the fingerstick needle after each use and DO NOT REUSE the insulin needles. These items should not be shared with anyone.        If you have any questions, please contact the main clinic at [phone] or your clinical RN at [phone]. We appreciate you choosing the [facility] clinic for your treatment.     Vanesa Zapata RN Care Coordinator   [facility]  Hepatology Clinic/Specialty Program

## 2022-01-04 NOTE — LETTER
June 14, 2022       TO: Sonja Perez  [address]  [place] [address]       Dear ,    We are writing to inform you of your test results.Approximately 3 months ago, you completed treatment for Hepatitis C. We recently retested for the virus, and the results show that the virus was Not Detected in your system. This is considered a cure! Congratulations!     When a cure is achieved:    1) You are no longer considered to be infectious for Hepatitis C.     2) A cure does not mean you have immunity. You can still be reinfected if you come into contact with infected blood. Avoiding sex with infected people, avoid reusing needles and razors that came into contact with infected blood.     3) You will always test positive for the Hepatitis C antibody. Your Hepatitis C RNA Quant (viral level) will remain undetected as long as you avoid risks for reinfection.     Please continue to take the necessary precautions to prevent reinfection. You have a follow up appointment scheduled on 9/6/2022 with Dr. Elaine.    If you have any further questions or concerns, you may contact me directly at [phone].      Thank you,     Vanesa Zapata RN, BSN  [facility]   RN Care Coordinator for Hepatology Specialty Clinic/Program

## 2022-06-06 ENCOUNTER — LAB (OUTPATIENT)
Dept: LAB | Facility: CLINIC | Age: 77
End: 2022-06-06
Payer: COMMERCIAL

## 2022-06-06 DIAGNOSIS — B18.2 CHRONIC HEPATITIS C WITHOUT HEPATIC COMA (H): ICD-10-CM

## 2022-06-06 LAB
ALBUMIN SERPL-MCNC: 3.1 G/DL (ref 3.4–5)
ALP SERPL-CCNC: 128 U/L (ref 40–150)
ALT SERPL W P-5'-P-CCNC: 13 U/L (ref 0–50)
AST SERPL W P-5'-P-CCNC: 20 U/L (ref 0–45)
BILIRUB DIRECT SERPL-MCNC: 0.2 MG/DL (ref 0–0.2)
BILIRUB SERPL-MCNC: 0.6 MG/DL (ref 0.2–1.3)
PROT SERPL-MCNC: 7.3 G/DL (ref 6.8–8.8)

## 2022-06-06 PROCEDURE — 36415 COLL VENOUS BLD VENIPUNCTURE: CPT | Performed by: PATHOLOGY

## 2022-06-06 PROCEDURE — 80076 HEPATIC FUNCTION PANEL: CPT | Performed by: PATHOLOGY

## 2022-06-06 PROCEDURE — 99000 SPECIMEN HANDLING OFFICE-LAB: CPT | Performed by: PATHOLOGY

## 2022-06-06 PROCEDURE — 87522 HEPATITIS C REVRS TRNSCRPJ: CPT | Mod: 90 | Performed by: PATHOLOGY

## 2022-06-09 LAB — HCV RNA SERPL NAA+PROBE-ACNC: NOT DETECTED IU/ML

## 2022-06-14 NOTE — PROGRESS NOTES
Pt had SVR 12 labs drawn on 6/6/22 and results show that the virus was not detected in pt's blood. Per Dr. Elaine, pt is considered cured of hepatitis C but will still need to follow up in the hepatology clinic. Attempted to reach patient to review results, no answer, message left requesting call back, number provided. Letter also sent. Pt has hepatology follow up on 9/6/22.

## 2022-09-29 DIAGNOSIS — B18.2 CHRONIC HEPATITIS C WITHOUT HEPATIC COMA (H): Primary | ICD-10-CM

## 2022-10-04 ENCOUNTER — LAB (OUTPATIENT)
Dept: LAB | Facility: CLINIC | Age: 77
End: 2022-10-04
Payer: COMMERCIAL

## 2022-10-04 ENCOUNTER — OFFICE VISIT (OUTPATIENT)
Dept: GASTROENTEROLOGY | Facility: CLINIC | Age: 77
End: 2022-10-04
Attending: INTERNAL MEDICINE
Payer: COMMERCIAL

## 2022-10-04 VITALS
HEART RATE: 73 BPM | SYSTOLIC BLOOD PRESSURE: 137 MMHG | DIASTOLIC BLOOD PRESSURE: 86 MMHG | OXYGEN SATURATION: 100 % | TEMPERATURE: 98.2 F | WEIGHT: 215 LBS | BODY MASS INDEX: 38 KG/M2

## 2022-10-04 DIAGNOSIS — B18.2 CHRONIC HEPATITIS C WITHOUT HEPATIC COMA (H): Primary | ICD-10-CM

## 2022-10-04 DIAGNOSIS — B18.2 CHRONIC HEPATITIS C WITHOUT HEPATIC COMA (H): ICD-10-CM

## 2022-10-04 LAB
ALBUMIN SERPL BCG-MCNC: 3.6 G/DL (ref 3.5–5.2)
ALP SERPL-CCNC: 118 U/L (ref 35–104)
ALT SERPL W P-5'-P-CCNC: 7 U/L (ref 10–35)
ANION GAP SERPL CALCULATED.3IONS-SCNC: 9 MMOL/L (ref 7–15)
AST SERPL W P-5'-P-CCNC: 25 U/L (ref 10–35)
BILIRUB DIRECT SERPL-MCNC: <0.2 MG/DL (ref 0–0.3)
BILIRUB SERPL-MCNC: 0.7 MG/DL
BUN SERPL-MCNC: 16.3 MG/DL (ref 8–23)
CALCIUM SERPL-MCNC: 9.5 MG/DL (ref 8.8–10.2)
CHLORIDE SERPL-SCNC: 105 MMOL/L (ref 98–107)
CREAT SERPL-MCNC: 1.26 MG/DL (ref 0.51–0.95)
DEPRECATED HCO3 PLAS-SCNC: 23 MMOL/L (ref 22–29)
ERYTHROCYTE [DISTWIDTH] IN BLOOD BY AUTOMATED COUNT: 12.6 % (ref 10–15)
GFR SERPL CREATININE-BSD FRML MDRD: 43 ML/MIN/1.73M2
GLUCOSE SERPL-MCNC: 91 MG/DL (ref 70–99)
HCT VFR BLD AUTO: 35.3 % (ref 35–47)
HGB BLD-MCNC: 11.7 G/DL (ref 11.7–15.7)
INR PPP: 1.11 (ref 0.85–1.15)
MCH RBC QN AUTO: 31 PG (ref 26.5–33)
MCHC RBC AUTO-ENTMCNC: 33.1 G/DL (ref 31.5–36.5)
MCV RBC AUTO: 93 FL (ref 78–100)
PLATELET # BLD AUTO: 135 10E3/UL (ref 150–450)
POTASSIUM SERPL-SCNC: 4.4 MMOL/L (ref 3.4–5.3)
PROT SERPL-MCNC: 7.3 G/DL (ref 6.4–8.3)
RBC # BLD AUTO: 3.78 10E6/UL (ref 3.8–5.2)
SODIUM SERPL-SCNC: 137 MMOL/L (ref 136–145)
WBC # BLD AUTO: 3.1 10E3/UL (ref 4–11)

## 2022-10-04 PROCEDURE — 85610 PROTHROMBIN TIME: CPT | Performed by: PATHOLOGY

## 2022-10-04 PROCEDURE — 80053 COMPREHEN METABOLIC PANEL: CPT | Performed by: PATHOLOGY

## 2022-10-04 PROCEDURE — 99000 SPECIMEN HANDLING OFFICE-LAB: CPT | Performed by: PATHOLOGY

## 2022-10-04 PROCEDURE — G0463 HOSPITAL OUTPT CLINIC VISIT: HCPCS

## 2022-10-04 PROCEDURE — 99213 OFFICE O/P EST LOW 20 MIN: CPT | Performed by: INTERNAL MEDICINE

## 2022-10-04 PROCEDURE — 82248 BILIRUBIN DIRECT: CPT | Performed by: PATHOLOGY

## 2022-10-04 PROCEDURE — 85027 COMPLETE CBC AUTOMATED: CPT | Performed by: PATHOLOGY

## 2022-10-04 PROCEDURE — 87522 HEPATITIS C REVRS TRNSCRPJ: CPT | Mod: 90 | Performed by: PATHOLOGY

## 2022-10-04 PROCEDURE — 36415 COLL VENOUS BLD VENIPUNCTURE: CPT | Performed by: PATHOLOGY

## 2022-10-04 ASSESSMENT — PAIN SCALES - GENERAL: PAINLEVEL: NO PAIN (0)

## 2022-10-04 NOTE — PROGRESS NOTES
[facility]    Hepatology follow-up    CONSULTING HEALTHCARE PROVIDER:  Sri Briggs, APRN, CNP    CHIEF COMPLAINT AND REASON FOR THE VISIT:  Hepatitis C virus infection.    SUBJECTIVE:  Ms. Perez is a 75-year-old female who is much younger than above stated age.  She has asthma and is on inhalers.  Otherwise, she appears to be healthy.  She had the diagnosis just now and she might have acquired early on in life.  She has also her hep B markers done and she has protective antibody titers as a result of infection.  She absolutely is not symptomatic.      Denies any jaundice, no signs of acute or chronic liver disease.  She has no abdominal pain, nausea, vomiting.  She is moving her bowels regularly at least once a day and she is in the obese range.    Medical hx Surgical hx   Past Medical History:   Diagnosis Date     Hepatitis C virus infection without hepatic coma, unspecified chronicity      History of Helicobacter pylori infection      Recurrent sinusitis 12/05/2018     Seasonal allergic rhinitis due to other allergic trigger 12/05/2018     Uncomplicated asthma       No past surgical history on file.       Medications  Prior to Admission medications    Medication Sig Start Date End Date Taking? Authorizing Provider   Acetaminophen (TYLENOL PO)    Yes Reported, Patient   cetirizine (ZYRTEC) 10 MG tablet Take 10 mg by mouth daily   Yes Reported, Patient   FLOVENT  MCG/ACT inhaler TAKE 2 PUFFS BY MOUTH TWICE A DAY 11/6/18  Yes Reported, Patient   montelukast (SINGULAIR) 10 MG tablet Take 1 tablet (10 mg) by mouth At Bedtime 12/5/18  Yes Stef Robbins MD   Multiple Vitamins-Minerals (VITAMIN D3 COMPLETE PO) TAKE 1 TABLET BY MOUTH EVERY DAY 3/28/20  Yes Reported, Patient   pantoprazole (PROTONIX) 40 MG EC tablet TAKE 1 TABLET (40 MG) BY MOUTH DAILY 30 MINUTES BEFORE BREAKFAST 1/6/21  Yes Reported, Patient   QVAR REDIHALER 80 MCG/ACT inhaler INHALE 1 PUFF BY MOUTH TWICE A  DAY INTO EACH NOSTRIL 10/22/18  Yes Reported, Patient   REFRESH OPTIVE ADVANCED 0.5-1-0.5 % SOLN 1 DROP IN BOTH EYES 3 TIMES DAILY 10/11/18  Yes Reported, Patient   tolterodine ER (DETROL LA) 4 MG 24 hr capsule Take 1 capsule (4 mg) by mouth daily 4/1/21  Yes Marciano Stein MD   VENTOLIN  (90 Base) MCG/ACT inhaler Inhale 2 puffs into the lungs every 4 hours as needed 12/30/17  Yes Reported, Patient       Allergies  Allergies   Allergen Reactions     Oxybutynin Itching     Other reaction(s): Edema       Review of systems  A 10-point review of systems was negative    Examination  There were no vitals taken for this visit.  There is no height or weight on file to calculate BMI.    Gen- well, NAD, A+Ox3, normal color  Lym- no palpable LAD  CVS- RRR  RS- CTA  Abd- Obese.Not tender.  Extr- hands normal, no ADY  Skin- no rash or jaundice  Neuro- no asterixis  Psych- normal mood    Laboratory  Lab Results   Component Value Date     03/24/2021    POTASSIUM 4.1 03/24/2021    CHLORIDE 108 03/24/2021    CO2 29 03/24/2021    BUN 15 03/24/2021    CR 0.97 03/24/2021       Lab Results   Component Value Date    BILITOTAL 0.6 03/24/2021    ALT 29 03/24/2021    AST 26 03/24/2021    ALKPHOS 102 03/24/2021       Lab Results   Component Value Date    ALBUMIN 2.7 03/24/2021    PROTTOTAL 7.3 03/24/2021        Lab Results   Component Value Date    WBC 5.4 03/24/2021    HGB 11.6 03/24/2021    MCV 96 03/24/2021     03/24/2021       Lab Results   Component Value Date    INR 1.13 03/24/2021       Radiology    ASSESSMENT AND PLAN:  Hep C virus infection.  Ms. Perez has hepatitis C virus infection.  She has a low viral load.  She does not have any signs of chronic liver disease.  She will need to be treated and we will treat her with 8 weeks of Mavyret and we will do that.  Otherwise, she is doing quite well.  She will follow with her primary care physician and for healthcare maintenance issues.  She has problem with  obesity and we did discuss with her that she will need to go down with the weight.  As she cannot exercise, I think her best option is to diet and stay away from carbohydrates.  Please note that her BMI is 39.01.    This was a 25-minute visit of which more than 50% was spent in explaining to the patient and her daughter what our plan of care was.  We answered all her questions.       cc:  Ms. Sonja Dodson MD  Hepatology  [facility]

## 2022-10-04 NOTE — PROGRESS NOTES
[facility] Hepatology    Follow-up Visit    CHIEF COMPLAINT AND REASON FOR THE VISIT:  Chronic hepatitis C virus infection.    SUBJECTIVE:  Ms. Perez is a 76-year-old [place] immigrant whom we have seen here for chronic hepatitis C virus infection.  She also had other comorbidities including asthma, which is her main problem.  We did a full workup and it showed that, besides the hepatitis C, she had also hepatitis B surface antibody with protective titers and hepatitis B core antibody.  This was a sign of resolved disease.      Since I last saw her 11/17/2020, she had done treatment with Mayvret and this is one of the direct acting antivirals and this is glecaprevir/pibrentasvir and she has finished that.  We did repeat her labs, although she has missed appointments, and her hepatitis C HCV RNA was negative on 06/06/2022, which in my opinion, we could have considered that as it was done much later than her treatment as a sign of a sustained virological response.  Today she is coming with no symptoms except her asthma attacks.  She does not have any fluid retention.  Her weight, which is in the obese range, almost remained the same, 5 pounds less at 215.  She otherwise has good appetite.  No abdominal pain, nausea, vomiting.  She is moving her bowels 1-2 a day with no blood in it.  She never had colonoscopy and is not interested in pursuing that. As far as the COVID vaccination is concerned, she has done the Moderna and has done 3 doses so far.    Medical hx Surgical hx   Past Medical History:   Diagnosis Date     Hepatitis C virus infection without hepatic coma, unspecified chronicity      History of Helicobacter pylori infection      Recurrent sinusitis 12/05/2018     Seasonal allergic rhinitis due to other allergic trigger 12/05/2018     Uncomplicated asthma       No past surgical history on file.       Medications  Prior to Admission medications    Medication Sig Start Date End Date Taking? Authorizing  Provider   Acetaminophen (TYLENOL PO)    Yes Reported, Patient   FLOVENT  MCG/ACT inhaler TAKE 2 PUFFS BY MOUTH TWICE A DAY 11/6/18  Yes Reported, Patient   pantoprazole (PROTONIX) 40 MG EC tablet TAKE 1 TABLET (40 MG) BY MOUTH DAILY 30 MINUTES BEFORE BREAKFAST 1/6/21  Yes Reported, Patient   REFRESH OPTIVE ADVANCED 0.5-1-0.5 % SOLN 1 DROP IN BOTH EYES 3 TIMES DAILY 10/11/18  Yes Reported, Patient   cetirizine (ZYRTEC) 10 MG tablet Take 10 mg by mouth daily  Patient not taking: Reported on 10/4/2022    Reported, Patient   glecaprevir-pibrentasvir (MAVYRET) 100-40 MG per tablet Take 3 tablets by mouth daily (with breakfast)  Patient not taking: Reported on 10/4/2022 12/14/21   Chris Elaine MD   montelukast (SINGULAIR) 10 MG tablet Take 1 tablet (10 mg) by mouth At Bedtime  Patient not taking: Reported on 10/4/2022 12/5/18   Stef Robbins MD   Multiple Vitamins-Minerals (VITAMIN D3 COMPLETE PO) TAKE 1 TABLET BY MOUTH EVERY DAY  Patient not taking: Reported on 10/4/2022 3/28/20   Reported, Patient   QVAR REDIHALER 80 MCG/ACT inhaler INHALE 1 PUFF BY MOUTH TWICE A DAY INTO EACH NOSTRIL  Patient not taking: Reported on 10/4/2022 10/22/18   Reported, Patient   tolterodine ER (DETROL LA) 4 MG 24 hr capsule Take 1 capsule (4 mg) by mouth daily  Patient not taking: Reported on 10/4/2022 4/1/21   Marciano Stein MD   VENTOLIN  (90 Base) MCG/ACT inhaler Inhale 2 puffs into the lungs every 4 hours as needed  Patient not taking: Reported on 10/4/2022 12/30/17   Reported, Patient       Allergies  Allergies   Allergen Reactions     Oxybutynin Itching     Other reaction(s): Edema       Review of systems  A 10-point review of systems was negative    Examination  /86   Pulse 73   Temp 98.2  F (36.8  C) (Oral)   Wt 97.5 kg (215 lb)   SpO2 100%   BMI 38.00 kg/m    Body mass index is 38 kg/m .    Gen- well, NAD, A+Ox3, normal color  Lym- no palpable LAD  CVS- RRR  RS- CTA  Abd- Obese. Not  tender.  Extr- hands normal, no ADY  Skin- no rash or jaundice  Neuro- no asterixis  Psych- normal mood    Laboratory  Lab Results   Component Value Date     11/17/2021     03/24/2021    POTASSIUM 3.9 11/17/2021    POTASSIUM 4.1 03/24/2021    CHLORIDE 112 11/17/2021    CHLORIDE 108 03/24/2021    CO2 22 11/17/2021    CO2 29 03/24/2021    BUN 19 11/17/2021    BUN 15 03/24/2021    CR 0.96 11/17/2021    CR 0.97 03/24/2021       Lab Results   Component Value Date    BILITOTAL 0.6 06/06/2022    BILITOTAL 0.6 03/24/2021    ALT 13 06/06/2022    ALT 29 03/24/2021    AST 20 06/06/2022    AST 26 03/24/2021    ALKPHOS 128 06/06/2022    ALKPHOS 102 03/24/2021       Lab Results   Component Value Date    ALBUMIN 3.1 06/06/2022    ALBUMIN 2.7 03/24/2021    PROTTOTAL 7.3 06/06/2022    PROTTOTAL 7.3 03/24/2021        Lab Results   Component Value Date    WBC 4.6 11/17/2021    WBC 5.4 03/24/2021    HGB 11.7 11/17/2021    HGB 11.6 03/24/2021    MCV 93 11/17/2021    MCV 96 03/24/2021     11/17/2021     03/24/2021       Lab Results   Component Value Date    INR 1.13 11/17/2021    INR 1.13 03/24/2021       Radiology    ASSESSMENT AND PLAN:  Hepatitis C virus infection.  Ms. Perez has chronic hepatitis C virus infection, probably acquired early in her life.  She did have a low viral load, showed no signs of chronic liver disease and we treated her with 8 weeks of Mayvret as indicated above.  She did very well after that and she had negative tests in June of the HCV RNA.  We will repeat one more time to make sure where we are.  Please note that her liver function tests were normal and she did not show any signs of chronic liver disease.      She has also obesity with a BMI of 38.  She will need to get rid of some of the weight, but that is going to be difficult.  This can be addressed by her primary care physician as we planned that she can follow up with him as this might predispose her to arthritis and other  metabolic issues.  For her healthcare maintenance issues, she will follow with her PCP and she will be seen here as needed.    Chris Elaine MD  Hepatology  [facility]

## 2022-10-04 NOTE — LETTER
10/4/2022         RE: Sonja Perez  [address]  [facility] [address]        Dear Colleague,    Thank you for referring your patient, Sonja Perez, to the [facility] [place]. Please see a copy of my visit note below.    [facility] Hepatology    Follow-up Visit    CHIEF COMPLAINT AND REASON FOR THE VISIT:  Chronic hepatitis C virus infection.    SUBJECTIVE:  Ms. Perez is a 76-year-old [place] immigrant whom we have seen here for chronic hepatitis C virus infection.  She also had other comorbidities including asthma, which is her main problem.  We did a full workup and it showed that, besides the hepatitis C, she had also hepatitis B surface antibody with protective titers and hepatitis B core antibody.  This was a sign of resolved disease.      Since I last saw her 11/17/2020, she had done treatment with Mayvret and this is one of the direct acting antivirals and this is glecaprevir/pibrentasvir and she has finished that.  We did repeat her labs, although she has missed appointments, and her hepatitis C HCV RNA was negative on 06/06/2022, which in my opinion, we could have considered that as it was done much later than her treatment as a sign of a sustained virological response.  Today she is coming with no symptoms except her asthma attacks.  She does not have any fluid retention.  Her weight, which is in the obese range, almost remained the same, 5 pounds less at 215.  She otherwise has good appetite.  No abdominal pain, nausea, vomiting.  She is moving her bowels 1-2 a day with no blood in it.  She never had colonoscopy and is not interested in pursuing that. As far as the COVID vaccination is concerned, she has done the Moderna and has done 3 doses so far.    Medical hx Surgical hx   Past Medical History:   Diagnosis Date     Hepatitis C virus infection without hepatic coma, unspecified chronicity      History of Helicobacter pylori infection       Recurrent sinusitis 12/05/2018     Seasonal allergic rhinitis due to other allergic trigger 12/05/2018     Uncomplicated asthma       No past surgical history on file.       Medications  Prior to Admission medications    Medication Sig Start Date End Date Taking? Authorizing Provider   Acetaminophen (TYLENOL PO)    Yes Reported, Patient   FLOVENT  MCG/ACT inhaler TAKE 2 PUFFS BY MOUTH TWICE A DAY 11/6/18  Yes Reported, Patient   pantoprazole (PROTONIX) 40 MG EC tablet TAKE 1 TABLET (40 MG) BY MOUTH DAILY 30 MINUTES BEFORE BREAKFAST 1/6/21  Yes Reported, Patient   REFRESH OPTIVE ADVANCED 0.5-1-0.5 % SOLN 1 DROP IN BOTH EYES 3 TIMES DAILY 10/11/18  Yes Reported, Patient   cetirizine (ZYRTEC) 10 MG tablet Take 10 mg by mouth daily  Patient not taking: Reported on 10/4/2022    Reported, Patient   glecaprevir-pibrentasvir (MAVYRET) 100-40 MG per tablet Take 3 tablets by mouth daily (with breakfast)  Patient not taking: Reported on 10/4/2022 12/14/21   Chris Elaine MD   montelukast (SINGULAIR) 10 MG tablet Take 1 tablet (10 mg) by mouth At Bedtime  Patient not taking: Reported on 10/4/2022 12/5/18   Stef Robbins MD   Multiple Vitamins-Minerals (VITAMIN D3 COMPLETE PO) TAKE 1 TABLET BY MOUTH EVERY DAY  Patient not taking: Reported on 10/4/2022 3/28/20   Reported, Patient   QVAR REDIHALER 80 MCG/ACT inhaler INHALE 1 PUFF BY MOUTH TWICE A DAY INTO EACH NOSTRIL  Patient not taking: Reported on 10/4/2022 10/22/18   Reported, Patient   tolterodine ER (DETROL LA) 4 MG 24 hr capsule Take 1 capsule (4 mg) by mouth daily  Patient not taking: Reported on 10/4/2022 4/1/21   Marciano Stein MD   VENTOLIN  (90 Base) MCG/ACT inhaler Inhale 2 puffs into the lungs every 4 hours as needed  Patient not taking: Reported on 10/4/2022 12/30/17   Reported, Patient       Allergies  Allergies   Allergen Reactions     Oxybutynin Itching     Other reaction(s): Edema       Review of systems  A 10-point review  of systems was negative    Examination  /86   Pulse 73   Temp 98.2  F (36.8  C) (Oral)   Wt 97.5 kg (215 lb)   SpO2 100%   BMI 38.00 kg/m    Body mass index is 38 kg/m .    Gen- well, NAD, A+Ox3, normal color  Lym- no palpable LAD  CVS- RRR  RS- CTA  Abd- Obese. Not tender.  Extr- hands normal, no ADY  Skin- no rash or jaundice  Neuro- no asterixis  Psych- normal mood    Laboratory  Lab Results   Component Value Date     11/17/2021     03/24/2021    POTASSIUM 3.9 11/17/2021    POTASSIUM 4.1 03/24/2021    CHLORIDE 112 11/17/2021    CHLORIDE 108 03/24/2021    CO2 22 11/17/2021    CO2 29 03/24/2021    BUN 19 11/17/2021    BUN 15 03/24/2021    CR 0.96 11/17/2021    CR 0.97 03/24/2021       Lab Results   Component Value Date    BILITOTAL 0.6 06/06/2022    BILITOTAL 0.6 03/24/2021    ALT 13 06/06/2022    ALT 29 03/24/2021    AST 20 06/06/2022    AST 26 03/24/2021    ALKPHOS 128 06/06/2022    ALKPHOS 102 03/24/2021       Lab Results   Component Value Date    ALBUMIN 3.1 06/06/2022    ALBUMIN 2.7 03/24/2021    PROTTOTAL 7.3 06/06/2022    PROTTOTAL 7.3 03/24/2021        Lab Results   Component Value Date    WBC 4.6 11/17/2021    WBC 5.4 03/24/2021    HGB 11.7 11/17/2021    HGB 11.6 03/24/2021    MCV 93 11/17/2021    MCV 96 03/24/2021     11/17/2021     03/24/2021       Lab Results   Component Value Date    INR 1.13 11/17/2021    INR 1.13 03/24/2021       Radiology    ASSESSMENT AND PLAN:  Hepatitis C virus infection.  Ms. Perez has chronic hepatitis C virus infection, probably acquired early in her life.  She did have a low viral load, showed no signs of chronic liver disease and we treated her with 8 weeks of Mayvret as indicated above.  She did very well after that and she had negative tests in June of the HCV RNA.  We will repeat one more time to make sure where we are.  Please note that her liver function tests were normal and she did not show any signs of chronic liver disease.       She has also obesity with a BMI of 38.  She will need to get rid of some of the weight, but that is going to be difficult.  This can be addressed by her primary care physician as we planned that she can follow up with him as this might predispose her to arthritis and other metabolic issues.  For her healthcare maintenance issues, she will follow with her PCP and she will be seen here as needed.    Chris Elaine MD  Hepatology  [facility]

## 2022-10-04 NOTE — NURSING NOTE
Chief Complaint   Patient presents with     RECHECK       /86   Pulse 73   Temp 98.2  F (36.8  C) (Oral)   Wt 97.5 kg (215 lb)   SpO2 100%   BMI 38.00 kg/m      Jagdish Humphries on 10/4/2022 at 9:07 AM

## 2022-10-05 LAB — HCV RNA SERPL NAA+PROBE-ACNC: NOT DETECTED IU/ML
